# Patient Record
Sex: MALE | Race: WHITE | HISPANIC OR LATINO | Employment: FULL TIME | URBAN - METROPOLITAN AREA
[De-identification: names, ages, dates, MRNs, and addresses within clinical notes are randomized per-mention and may not be internally consistent; named-entity substitution may affect disease eponyms.]

---

## 2018-10-12 ENCOUNTER — HOSPITAL ENCOUNTER (EMERGENCY)
Facility: MEDICAL CENTER | Age: 38
End: 2018-10-12
Attending: EMERGENCY MEDICINE

## 2018-10-12 VITALS
TEMPERATURE: 98.2 F | RESPIRATION RATE: 18 BRPM | HEART RATE: 105 BPM | DIASTOLIC BLOOD PRESSURE: 93 MMHG | OXYGEN SATURATION: 95 % | WEIGHT: 184.3 LBS | SYSTOLIC BLOOD PRESSURE: 152 MMHG

## 2018-10-12 DIAGNOSIS — L03.115 CELLULITIS OF RIGHT LOWER EXTREMITY: ICD-10-CM

## 2018-10-12 PROCEDURE — 700102 HCHG RX REV CODE 250 W/ 637 OVERRIDE(OP): Performed by: EMERGENCY MEDICINE

## 2018-10-12 PROCEDURE — 99283 EMERGENCY DEPT VISIT LOW MDM: CPT

## 2018-10-12 PROCEDURE — A9270 NON-COVERED ITEM OR SERVICE: HCPCS | Performed by: EMERGENCY MEDICINE

## 2018-10-12 RX ORDER — DOXYCYCLINE 100 MG/1
100 CAPSULE ORAL 2 TIMES DAILY
Qty: 20 CAP | Refills: 0 | Status: SHIPPED | OUTPATIENT
Start: 2018-10-12 | End: 2018-10-22

## 2018-10-12 RX ORDER — DOXYCYCLINE 100 MG/1
100 TABLET ORAL ONCE
Status: COMPLETED | OUTPATIENT
Start: 2018-10-12 | End: 2018-10-12

## 2018-10-12 RX ORDER — DOXYCYCLINE 100 MG/1
100 CAPSULE ORAL 2 TIMES DAILY
Qty: 20 CAP | Refills: 0 | Status: SHIPPED | OUTPATIENT
Start: 2018-10-12 | End: 2018-10-12

## 2018-10-12 RX ADMIN — DOXYCYCLINE 100 MG: 100 TABLET ORAL at 02:47

## 2018-10-12 NOTE — ED NOTES
Pt reports that he's had  2 days of right foot pain/swelling/redness. Has had a previous episode of this, was told that had an infection and had to be on ABX

## 2018-10-12 NOTE — PROGRESS NOTES
Patient is being discharged from ED to home. Discharge instructions were discussed by RN with patient and/or Family. No questions at this time. Medications were discussed with patient. VS within normal limits or have been addressed with patient and MD.     Discussed DC ABX

## 2018-10-12 NOTE — ED PROVIDER NOTES
ER Provider Note     Scribed for Art Guerrero M.D. by Carlos Carbajal. 10/12/2018, 2:37 AM.    Means of Arrival: Walk in   History obtained from: Patient  History limited by: None     CHIEF COMPLAINT  Chief Complaint   Patient presents with   • Foot Pain     right foot.       HPI  Jude Moreno is a 28 y.o. male who presents to the Emergency Department with right sided foot pain and swelling. Patient had this same issue a while ago and was given antibiotics that initially relieved his symptoms. However the pain and swelling has returned. Patient has no history of diabetes or other medical issues.    REVIEW OF SYSTEMS  See HPI for further details.    PAST MEDICAL HISTORY  None noted    SURGICAL HISTORY  patient denies any surgical history    SOCIAL HISTORY  Social History   Substance Use Topics   • Smoking status: Current Every Day Smoker     Packs/day: 0.50     Types: Cigarettes   • Smokeless tobacco: Not on file   • Alcohol use No      Comment: sober for two weeks      History   Drug Use No       FAMILY HISTORY  None noted    CURRENT MEDICATIONS  No current facility-administered medications for this encounter.     Current Outpatient Prescriptions:   •  doxycycline (MONODOX) 100 MG capsule, Take 1 Cap by mouth 2 times a day for 10 days., Disp: 20 Cap, Rfl: 0     ALLERGIES  No Known Allergies    PHYSICAL EXAM  VITAL SIGNS: /93   Pulse (!) 105   Temp 36.8 °C (98.2 °F)   Resp 18   Wt 83.6 kg (184 lb 4.9 oz)   SpO2 95%      Constitutional: Alert in no apparent distress.  HENT: Normocephalic, Atraumatic, Bilateral external ears normal. Nose normal.   Eyes: Pupils are equal and reactive. Conjunctiva normal, non-icteric.   Heart: Regular rate and rhythm, no murmurs.    Lungs: Clear to auscultation bilaterally.  Skin: Warm, Dry.   Extremities: Present dorsalis pedis pulses. Redness noted to the lateral aspect of right foot extending from the mid foot to the 3rd, 4th, and 5th toes.  Neurologic: Alert,  Grossly non-focal.   Psychiatric: Affect normal, Judgment normal, Mood normal, Appears appropriate and not intoxicated.       COURSE & MEDICAL DECISION MAKING  Pertinent Labs & Imaging studies reviewed. (See chart for details)    This is a 28 y.o. male that presents with cellulitis of his right foot.  There is no crepitance or any systemic symptoms to suggest significant underlying disease.  At this time I will treat the patient with antibiotics and have him follow-up with the primary care physician..     2:37 AM - Patient seen and examined at bedside.  Patient will be medicated with Adoxa 100mg for his symptoms.     The patient will return for new or worsening symptoms and is stable at the time of discharge.    DISPOSITION:  Patient will be discharged home in stable condition.    FOLLOW UP:  I told the patient to return the emergency department if symptoms worsen and follow-up with his primary care physician.    OUTPATIENT MEDICATIONS:  Discharge Medication List as of 10/12/2018  2:44 AM      START taking these medications    Details   doxycycline (MONODOX) 100 MG capsule Take 1 Cap by mouth 2 times a day for 10 days., Disp-20 Cap, R-0, Print Rx Paper              FINAL IMPRESSION  1. Cellulitis of right lower extremity          Carlos GARCIA (Scribe), am scribing for, and in the presence of, Art Guerrero M.D..    Electronically signed by: Carlos Carbajal (Margot), 10/12/2018    Art GARCIA M.D. personally performed the services described in this documentation, as scribed by Carlos Carbajal in my presence, and it is both accurate and complete. E.    The note accurately reflects work and decisions made by me.  Art Guerrero  10/12/2018  3:36 AM

## 2018-10-12 NOTE — ED TRIAGE NOTES
Chief Complaint   Patient presents with   • Foot Pain     right foot.     /93   Pulse (!) 105   Temp 36.8 °C (98.2 °F)   Resp 18   Wt 83.6 kg (184 lb 4.9 oz)   SpO2 95%     Pt states that he is having right foot pain, lateral side. Foot appears slightly swollen and red, pain includes fifth digit.     Pt was treated in [place] for the same issue several months ago, symptoms resolved and pain restarted today when he woke up.     Pt placed back out into lobby.

## 2019-03-12 ENCOUNTER — HOSPITAL ENCOUNTER (EMERGENCY)
Facility: MEDICAL CENTER | Age: 39
End: 2019-03-13
Attending: EMERGENCY MEDICINE

## 2019-03-12 DIAGNOSIS — G44.89 OTHER HEADACHE SYNDROME: ICD-10-CM

## 2019-03-12 PROCEDURE — 85027 COMPLETE CBC AUTOMATED: CPT

## 2019-03-12 PROCEDURE — 36415 COLL VENOUS BLD VENIPUNCTURE: CPT

## 2019-03-12 PROCEDURE — 80053 COMPREHEN METABOLIC PANEL: CPT

## 2019-03-12 PROCEDURE — 85007 BL SMEAR W/DIFF WBC COUNT: CPT

## 2019-03-12 PROCEDURE — 99283 EMERGENCY DEPT VISIT LOW MDM: CPT

## 2019-03-13 ENCOUNTER — APPOINTMENT (OUTPATIENT)
Dept: RADIOLOGY | Facility: MEDICAL CENTER | Age: 39
End: 2019-03-13
Attending: EMERGENCY MEDICINE

## 2019-03-13 VITALS
HEART RATE: 102 BPM | BODY MASS INDEX: 28.73 KG/M2 | TEMPERATURE: 98.5 F | RESPIRATION RATE: 15 BRPM | WEIGHT: 178.79 LBS | HEIGHT: 66 IN | SYSTOLIC BLOOD PRESSURE: 153 MMHG | OXYGEN SATURATION: 96 % | DIASTOLIC BLOOD PRESSURE: 107 MMHG

## 2019-03-13 LAB
ALBUMIN SERPL BCP-MCNC: 4.5 G/DL (ref 3.2–4.9)
ALBUMIN/GLOB SERPL: 1.4 G/DL
ALP SERPL-CCNC: 117 U/L (ref 30–99)
ALT SERPL-CCNC: 35 U/L (ref 2–50)
AMPHET UR QL SCN: NEGATIVE
ANION GAP SERPL CALC-SCNC: 9 MMOL/L (ref 0–11.9)
APPEARANCE UR: CLEAR
AST SERPL-CCNC: 43 U/L (ref 12–45)
BACTERIA #/AREA URNS HPF: NEGATIVE /HPF
BARBITURATES UR QL SCN: NEGATIVE
BASOPHILS # BLD AUTO: 0 % (ref 0–1.8)
BASOPHILS # BLD: 0 K/UL (ref 0–0.12)
BENZODIAZ UR QL SCN: NEGATIVE
BILIRUB SERPL-MCNC: 0.4 MG/DL (ref 0.1–1.5)
BILIRUB UR QL STRIP.AUTO: NEGATIVE
BUN SERPL-MCNC: 7 MG/DL (ref 8–22)
BZE UR QL SCN: NEGATIVE
CALCIUM SERPL-MCNC: 9 MG/DL (ref 8.5–10.5)
CANNABINOIDS UR QL SCN: NEGATIVE
CHLORIDE SERPL-SCNC: 105 MMOL/L (ref 96–112)
CO2 SERPL-SCNC: 23 MMOL/L (ref 20–33)
COLOR UR: YELLOW
CREAT SERPL-MCNC: 0.72 MG/DL (ref 0.5–1.4)
EKG IMPRESSION: NORMAL
EOSINOPHIL # BLD AUTO: 0.38 K/UL (ref 0–0.51)
EOSINOPHIL NFR BLD: 3.4 % (ref 0–6.9)
EPI CELLS #/AREA URNS HPF: NEGATIVE /HPF
ERYTHROCYTE [DISTWIDTH] IN BLOOD BY AUTOMATED COUNT: 43.1 FL (ref 35.9–50)
GLOBULIN SER CALC-MCNC: 3.2 G/DL (ref 1.9–3.5)
GLUCOSE SERPL-MCNC: 114 MG/DL (ref 65–99)
GLUCOSE UR STRIP.AUTO-MCNC: 100 MG/DL
HCT VFR BLD AUTO: 57.1 % (ref 42–52)
HGB BLD-MCNC: 20 G/DL (ref 14–18)
HYALINE CASTS #/AREA URNS LPF: ABNORMAL /LPF
KETONES UR STRIP.AUTO-MCNC: NEGATIVE MG/DL
LEUKOCYTE ESTERASE UR QL STRIP.AUTO: NEGATIVE
LYMPHOCYTES # BLD AUTO: 2.78 K/UL (ref 1–4.8)
LYMPHOCYTES NFR BLD: 24.8 % (ref 22–41)
MANUAL DIFF BLD: NORMAL
MCH RBC QN AUTO: 32.3 PG (ref 27–33)
MCHC RBC AUTO-ENTMCNC: 35 G/DL (ref 33.7–35.3)
MCV RBC AUTO: 92.2 FL (ref 81.4–97.8)
METHADONE UR QL SCN: NEGATIVE
MICRO URNS: ABNORMAL
MONOCYTES # BLD AUTO: 0.86 K/UL (ref 0–0.85)
MONOCYTES NFR BLD AUTO: 7.7 % (ref 0–13.4)
MORPHOLOGY BLD-IMP: NORMAL
MYELOCYTES NFR BLD MANUAL: 0.9 %
NEUTROPHILS # BLD AUTO: 7.08 K/UL (ref 1.82–7.42)
NEUTROPHILS NFR BLD: 63.2 % (ref 44–72)
NITRITE UR QL STRIP.AUTO: NEGATIVE
NRBC # BLD AUTO: 0 K/UL
NRBC BLD-RTO: 0 /100 WBC
OPIATES UR QL SCN: NEGATIVE
OXYCODONE UR QL SCN: NEGATIVE
PCP UR QL SCN: NEGATIVE
PH UR STRIP.AUTO: 6.5 [PH]
PLATELET # BLD AUTO: 330 K/UL (ref 164–446)
PLATELET BLD QL SMEAR: NORMAL
PMV BLD AUTO: 9.2 FL (ref 9–12.9)
POTASSIUM SERPL-SCNC: 4.5 MMOL/L (ref 3.6–5.5)
PROPOXYPH UR QL SCN: NEGATIVE
PROT SERPL-MCNC: 7.7 G/DL (ref 6–8.2)
PROT UR QL STRIP: 30 MG/DL
RBC # BLD AUTO: 6.19 M/UL (ref 4.7–6.1)
RBC # URNS HPF: ABNORMAL /HPF
RBC BLD AUTO: NORMAL
RBC UR QL AUTO: NEGATIVE
SODIUM SERPL-SCNC: 137 MMOL/L (ref 135–145)
SP GR UR STRIP.AUTO: 1.01
UROBILINOGEN UR STRIP.AUTO-MCNC: 1 MG/DL
WBC # BLD AUTO: 11.2 K/UL (ref 4.8–10.8)
WBC #/AREA URNS HPF: ABNORMAL /HPF

## 2019-03-13 PROCEDURE — 93005 ELECTROCARDIOGRAM TRACING: CPT | Performed by: EMERGENCY MEDICINE

## 2019-03-13 PROCEDURE — 700102 HCHG RX REV CODE 250 W/ 637 OVERRIDE(OP): Performed by: EMERGENCY MEDICINE

## 2019-03-13 PROCEDURE — 70450 CT HEAD/BRAIN W/O DYE: CPT

## 2019-03-13 PROCEDURE — 81001 URINALYSIS AUTO W/SCOPE: CPT

## 2019-03-13 PROCEDURE — 71045 X-RAY EXAM CHEST 1 VIEW: CPT

## 2019-03-13 PROCEDURE — A9270 NON-COVERED ITEM OR SERVICE: HCPCS | Performed by: EMERGENCY MEDICINE

## 2019-03-13 PROCEDURE — 80307 DRUG TEST PRSMV CHEM ANLYZR: CPT

## 2019-03-13 RX ORDER — ACETAMINOPHEN 325 MG/1
650 TABLET ORAL ONCE
Status: COMPLETED | OUTPATIENT
Start: 2019-03-13 | End: 2019-03-13

## 2019-03-13 RX ADMIN — ACETAMINOPHEN 650 MG: 325 TABLET, FILM COATED ORAL at 01:45

## 2019-03-13 NOTE — ED PROVIDER NOTES
ED Provider Note    Scribed for Vince Brooke M.D. by Jorge Adams. 3/13/2019, 12:30 AM.    Primary care provider: Pcp Pt States None  Means of arrival: walk-in  History obtained from: Patient  History limited by: none    CHIEF COMPLAINT  Chief Complaint   Patient presents with   • Dizziness   • Blurred Vision   • Neck Pain       HPI  Jude Montoya is a 38 y.o. male who presents to the Emergency Department with complaints of persistent dizziness that started five days ago (on Friday). He states that he was working at the onset of symptoms. The patient is currently employed as a  and has been able to work through the time that he has been feeling dizzy. He took tylenol at home on friday which helped somewhat alleviate the dizziness. The patient denies any syncopal episodes, vision changes, or recent falls. He associates headache, and fatigue. The patient denies shortness of breath, chest pain, or dysuria. He denies any history of stroke. The patient states that he currently smokes half a pack of cigarettes per day, and used to use cocaine and methamphetamine but no longer does. The patient denies any history of diabetes, or hypertension. The patient states that he has a family history of stroke and diabetes. The patient states that [place] came to the [place] ago, and denies any recent travel outside of the country.    REVIEW OF SYSTEMS  See HPI for further details. All other systems are negative.     PAST MEDICAL HISTORY  Otherwise healthy    SURGICAL HISTORY  patient denies any surgical history    SOCIAL HISTORY  Social History   Substance Use Topics   • Smoking status: Current Every Day Smoker     Packs/day: 0.25     Types: Cigarettes   •     • Alcohol use No      History   Drug Use No       FAMILY HISTORY  Family history of stroke and diabetes    CURRENT MEDICATIONS  Reviewed.  See Encounter Summary.     ALLERGIES  No Known Allergies    PHYSICAL EXAM  VITAL SIGNS: /107    "Pulse (!) 102   Temp 36.9 °C (98.5 °F) (Temporal)   Resp 18   Ht 1.676 m (5' 6\")   Wt 81.1 kg (178 lb 12.7 oz)   SpO2 96%   BMI 28.86 kg/m²   Constitutional: Alert in no apparent distress.  HENT: No signs of trauma, Bilateral external ears normal, Nose normal.   Eyes: Pupils are equal and reactive, Conjunctiva normal, Non-icteric.   Neck: Normal range of motion, No tenderness, Supple, No stridor.   Cardiovascular: Regular rate and rhythm, no murmurs.   Thorax & Lungs: Normal breath sounds, No respiratory distress, No wheezing, No chest tenderness.   Abdomen: Bowel sounds normal, Soft, No tenderness, No masses, No pulsatile masses. No peritoneal signs.  Skin: Warm, Dry, No erythema, No rash.   Back: No bony tenderness, No CVA tenderness.   Extremities: Intact distal pulses, No edema, No tenderness, No cyanosis  Musculoskeletal: Good range of motion in all major joints. No tenderness to palpation or major deformities noted.   Neurologic: Alert , Normal motor function, Normal sensory function, No focal deficits noted. Finger to nose normal, heel to shin normal, cerebellar intact, cranial nerves II-XII intact, strength 5/5 and equal bilaterally, sensation intact throughout, cooperative, appropriate   Psychiatric: Affect normal, Judgment normal, Mood normal.     DIAGNOSTIC STUDIES / PROCEDURES     LABS  Results for orders placed or performed during the hospital encounter of 03/12/19   CBC WITH DIFFERENTIAL   Result Value Ref Range    WBC 11.2 (H) 4.8 - 10.8 K/uL    RBC 6.19 (H) 4.70 - 6.10 M/uL    Hemoglobin 20.0 (H) 14.0 - 18.0 g/dL    Hematocrit 57.1 (H) 42.0 - 52.0 %    MCV 92.2 81.4 - 97.8 fL    MCH 32.3 27.0 - 33.0 pg    MCHC 35.0 33.7 - 35.3 g/dL    RDW 43.1 35.9 - 50.0 fL    Platelet Count 330 164 - 446 K/uL    MPV 9.2 9.0 - 12.9 fL    Neutrophils-Polys 63.20 44.00 - 72.00 %    Lymphocytes 24.80 22.00 - 41.00 %    Monocytes 7.70 0.00 - 13.40 %    Eosinophils 3.40 0.00 - 6.90 %    Basophils 0.00 0.00 - 1.80 % "    Nucleated RBC 0.00 /100 WBC    Neutrophils (Absolute) 7.08 1.82 - 7.42 K/uL    Lymphs (Absolute) 2.78 1.00 - 4.80 K/uL    Monos (Absolute) 0.86 (H) 0.00 - 0.85 K/uL    Eos (Absolute) 0.38 0.00 - 0.51 K/uL    Baso (Absolute) 0.00 0.00 - 0.12 K/uL    NRBC (Absolute) 0.00 K/uL   COMP METABOLIC PANEL   Result Value Ref Range    Sodium 137 135 - 145 mmol/L    Potassium 4.5 3.6 - 5.5 mmol/L    Chloride 105 96 - 112 mmol/L    Co2 23 20 - 33 mmol/L    Anion Gap 9.0 0.0 - 11.9    Glucose 114 (H) 65 - 99 mg/dL    Bun 7 (L) 8 - 22 mg/dL    Creatinine 0.72 0.50 - 1.40 mg/dL    Calcium 9.0 8.5 - 10.5 mg/dL    AST(SGOT) 43 12 - 45 U/L    ALT(SGPT) 35 2 - 50 U/L    Alkaline Phosphatase 117 (H) 30 - 99 U/L    Total Bilirubin 0.4 0.1 - 1.5 mg/dL    Albumin 4.5 3.2 - 4.9 g/dL    Total Protein 7.7 6.0 - 8.2 g/dL    Globulin 3.2 1.9 - 3.5 g/dL    A-G Ratio 1.4 g/dL   URINALYSIS CULTURE, IF INDICATED   Result Value Ref Range    Color Yellow     Character Clear     Specific Gravity 1.013 <1.035    Ph 6.5 5.0 - 8.0    Glucose 100 (A) Negative mg/dL    Ketones Negative Negative mg/dL    Protein 30 (A) Negative mg/dL    Bilirubin Negative Negative    Urobilinogen, Urine 1.0 Negative    Nitrite Negative Negative    Leukocyte Esterase Negative Negative    Occult Blood Negative Negative    Micro Urine Req Microscopic    URINE DRUG SCREEN   Result Value Ref Range    Amphetamines Urine Negative Negative    Barbiturates Negative Negative    Benzodiazepines Negative Negative    Cocaine Metabolite Negative Negative    Methadone Negative Negative    Opiates Negative Negative    Oxycodone Negative Negative    Phencyclidine -Pcp Negative Negative    Propoxyphene Negative Negative    Cannabinoid Metab Negative Negative   URINE MICROSCOPIC (W/UA)   Result Value Ref Range    WBC 0-2 (A) /hpf    RBC 0-2 (A) /hpf    Bacteria Negative None /hpf    Epithelial Cells Negative /hpf    Hyaline Cast 0-2 /lpf   ESTIMATED GFR   Result Value Ref Range    GFR If  African American >60 >60 mL/min/1.73 m 2    GFR If Non African American >60 >60 mL/min/1.73 m 2   DIFFERENTIAL MANUAL   Result Value Ref Range    Myelocytes 0.90 %    Manual Diff Status PERFORMED    PERIPHERAL SMEAR REVIEW   Result Value Ref Range    Peripheral Smear Review see below    PLATELET ESTIMATE   Result Value Ref Range    Plt Estimation Normal    MORPHOLOGY   Result Value Ref Range    RBC Morphology Normal    EKG (NOW)   Result Value Ref Range    Report       [facility] Emergency Dept.    Test Date:  2019  Pt Name:    LARISA ORTA       Department: ER  MRN:        [id number]                      Room:       [facility]  Gender:     Male                         Technician: [id number]  :        1980                   Requested By:MABEL MARTINEZ  Order #:    [id number]                    Reading MD:    Measurements  Intervals                                Axis  Rate:       81                           P:          55  MI:         164                          QRS:        85  QRSD:       84                           T:          25  QT:         372  QTc:        432    Interpretive Statements  SINUS RHYTHM  BORDERLINE T WAVE ABNORMALITIES  BASELINE WANDER IN LEAD(S) V4  No previous ECG available for comparison       All labs were reviewed by me.    EKG  12 Lead EKG interpreted by me to show:  Sinus rhythm  Rate 81  Borderline Right axis deviation  Intervals: Normal  No acute ST wave changes  Inverted T waves in III and aVF    RADIOLOGY  CT-HEAD W/O   Final Result      Head CT without contrast within normal limits. No evidence of acute cerebral infarction, hemorrhage or mass lesion.      DX-CHEST-PORTABLE (1 VIEW)   Final Result      No acute cardiac or pulmonary abnormalities are identified.        The radiologist's interpretation of all radiological studies and images have been reviewed by me.    COURSE & MEDICAL DECISION MAKING  Pertinent Labs & Imaging studies reviewed. (See chart  for details)    12:30 AM - Patient seen and examined at bedside. Patient will be treated with tylenol 650 mg. Ordered CT Head W/O, DX-Chest, CBC with diffs, CMP, UA, Urine drug screen, and EKG to evaluate his symptoms.     Decision Making:  This is a 38 y.o. year old male who presents with above complaint.  Patient does not have a significant history of headaches.  He has been in the [place] for approximately 16 years with no foreign travel.  CT imaging, laboratory evaluation, EKG all does not show any acute abnormalities.  I have a very low suspicion for peripheral or central vertigo at this point.  More likely a headache variant.  I will discharge him to home with outpatient follow-up with clinic as referred.  He is understanding return precautions the ER if needed.  In the meantime he will use Tylenol, NSAIDs and oral hydration for comfort.  Furthermore he does have slightly elevated blood pressure at night which may be secondary to pain response but he will continue to monitor his blood pressure and follow-up with clinic regarding this should he require any treatment.    The patient will return for new or worsening symptoms and is stable at the time of discharge.      DISPOSITION:  Patient will be discharged home in stable condition.    FOLLOW UP:  [facility] / [facility] Internal Medicine  [address]  [address]  [facility] [id number] [phone]        [facility]  [address]  [facility] [address]  [phone]            OUTPATIENT MEDICATIONS:  There are no discharge medications for this patient.          FINAL IMPRESSION  1. Other headache syndrome          Jorge GARCIA), am scribing for, and in the presence of, Vince Brooke M.D..    Electronically signed by: Jorge Sal), 3/13/2019    Vince GARCIA M.D. personally performed the services described in this documentation, as scribed by Jorge Adams in my presence, and it is both accurate and complete.    C  The note  accurately reflects work and decisions made by me.  Vince Brooke  3/13/2019  6:50 AM

## 2019-03-13 NOTE — ED TRIAGE NOTES
"Chief Complaint   Patient presents with   • Dizziness   • Blurred Vision   • Neck Pain     /107   Pulse (!) 102   Temp 36.9 °C (98.5 °F) (Temporal)   Resp 18   Ht 1.676 m (5' 6\")   Wt 81.1 kg (178 lb 12.7 oz)   SpO2 96%   BMI 28.86 kg/m²     Pt has been having dizziness and blurred vision, started on Friday night with a headache, states it resolved over the weekend. Then it restarted yesterday, states he has been sleepy.    States his eyes have been burning, eyes do appear red. And states he had neck pain as well, he is able to fully move head in all directions, pain is present when turning head to the right.     -double vision, n/v, fever/chills.    No corrective lenses.     Pt states he stopped drinking approximately two weeks ago.       "

## 2019-03-14 ENCOUNTER — OFFICE VISIT (OUTPATIENT)
Dept: INTERNAL MEDICINE | Facility: MEDICAL CENTER | Age: 39
End: 2019-03-14

## 2019-03-14 VITALS
SYSTOLIC BLOOD PRESSURE: 120 MMHG | HEIGHT: 66 IN | HEART RATE: 62 BPM | WEIGHT: 180.25 LBS | BODY MASS INDEX: 28.97 KG/M2 | DIASTOLIC BLOOD PRESSURE: 80 MMHG | OXYGEN SATURATION: 94 % | TEMPERATURE: 97.4 F

## 2019-03-14 DIAGNOSIS — E66.3 OVERWEIGHT (BMI 25.0-29.9): ICD-10-CM

## 2019-03-14 DIAGNOSIS — R42 EPISODE OF DIZZINESS: ICD-10-CM

## 2019-03-14 DIAGNOSIS — F17.200 SMOKING: ICD-10-CM

## 2019-03-14 DIAGNOSIS — Z00.00 GENERAL MEDICAL EXAM: ICD-10-CM

## 2019-03-14 DIAGNOSIS — R89.9 ABNORMAL LABORATORY TEST: ICD-10-CM

## 2019-03-14 PROCEDURE — 99204 OFFICE O/P NEW MOD 45 MIN: CPT | Mod: GC | Performed by: INTERNAL MEDICINE

## 2019-03-14 RX ORDER — NICOTINE 21 MG/24HR
1 PATCH, TRANSDERMAL 24 HOURS TRANSDERMAL EVERY 24 HOURS
Qty: 14 PATCH | Refills: 0 | Status: SHIPPED | OUTPATIENT
Start: 2019-03-14 | End: 2019-09-12

## 2019-03-14 ASSESSMENT — PATIENT HEALTH QUESTIONNAIRE - PHQ9
CLINICAL INTERPRETATION OF PHQ2 SCORE: 1
SUM OF ALL RESPONSES TO PHQ QUESTIONS 1-9: 1
5. POOR APPETITE OR OVEREATING: 0 - NOT AT ALL

## 2019-03-14 NOTE — PROGRESS NOTES
"New Patient    Reason to establish: Hospital follow-up    Chief Complaint   Patient presents with   • Hospital Follow-up     dizzi, high BP         HPI:   Jude Montoya is a 38 y.o. male here for hospital follow-up and to establish.     Dizziness  Started on 3/8, and intermittently noted it on weekend (9th and 10th), then noted more on 11th and 12th.  Felt a bit off-balance, and thought both eyes were a bit blurry.    Noted a headache (generalized, all-over) on the 8th, but not since.   Notes he sometimes drinks 6-8 cups of water per days, but often drinks more soda or coffee, rather than water.     At ER, he had a normal CT-head, normal CXR, and had an EKG read as normal, but with possible  TWI in leads III and avF (per ER reading).   In ER, his BP was 157/107, (but then improved to 120's/80's) and  (then improved to ~ 80).  His temperature was normal range.      Today, he states he has no more symptoms. (Resolved since the visit to the ER).   In the office, today, his BP is 120/80.        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  So, even though he did not feel \"too bad\" with his prior balance issue, he was concerned, due to the work he does.     Additionally, he notes he is going through a divorce.  He has been  for 6 months.     He used to drink heavily on weekends, but stopped that recently.   Hx of cocaine and meth use in past (no iv).   Otherwise denies PMH, PSH, allergies, or medications.       Review of Symptoms  GEN/CONST:   Denies fever, fatigue, weakness, or significant weight change.   H/E:     Denies blurry vision or eye pain.  ENT:   Denies sinus pain, sore throat, ear pain, difficulty hearing, or tinnitus.  CARDIO:   Denies chest pain, palpitations, orthopnea, or edema.  RESP:   Denies shortness of breath, wheezing, or coughing.  GI:    Denies nausea, vomiting, diarrhea, constipation, or abdominal pain.   :   Denies dysuria, hematuria, hesitancy, or urgency.  HEME:  Denies " "easy bruising, bleeding,    ALLG:  Denies allergies, asthma, or hives.    MSK:  Denies weakness, edema, joint pains or swellings, or muscle aches.   SKIN:  Denies rashes, itches, or sores.   NEURO:  Denies numbness or tingling, altered sensation, or focal weakness.    ENDO:  Denies heat or cold intolerance, polyuria, or polydipsia.  PSYCH:  Denies anxiety, depression, or insomnia.       Past Medical History:   Diagnosis Date   • Known health problems: none        History reviewed. No pertinent surgical history.      Family History   Problem Relation Age of Onset   • Hypertension Mother    • Diabetes Mother    • Diabetes Father    • Stroke Father        Social History     Social History   • Marital status:      Spouse name: N/A   • Number of children: N/A   • Years of education: N/A     Occupational History   • Not on file.     Social History Main Topics   • Smoking status: Current Every Day Smoker     Packs/day: 0.50     Years: 18.00     Types: Cigarettes   • Smokeless tobacco: Former User     Types: Chew      Comment: Smoke about 10 cig. per day.   • Alcohol use Yes      Comment: Previously heavy use on weekends - cutting back, and trying to stop (3/2019)   • Drug use: No      Comment: Hx of coccain and meth (inh), but stopped in 2019   • Sexual activity: Not Currently     Partners: Female      Comment:      Other Topics Concern   • Not on file     Social History Narrative   • No narrative on file   Done with drinking on the weekends, now.       No current outpatient prescriptions on file.     No current facility-administered medications for this visit.    Denies medications.     Allergies as of 03/14/2019   • (No Known Allergies)   Denies allergies.       Physical Exam  /80 (BP Location: Left arm, Patient Position: Sitting, BP Cuff Size: Adult)   Pulse 62   Temp 36.3 °C (97.4 °F) (Temporal)   Ht 1.676 m (5' 6\")   Wt 81.8 kg (180 lb 4 oz)   SpO2 94%   BMI 29.09 kg/m²   General:  Alert and " oriented, No apparent distress.  Eyes: Pupils equal and reactive. No scleral icterus.   Throat: Clear, no erythema or exudates noted.  Neck: Supple. No lymphadenopathy noted. Thyroid not enlarged.   Lungs: Clear to auscultation bilaterally.  No wheezes or rales.  Cardiovascular: Regular rate and rhythm.  No murmurs, rubs or gallops.  Abdomen:  Soft.  Non-distended.  Non-tender.  No rebound or guarding noted.  Extremities: No pedal edema.  Good general motion of all 4 extremities.    Neurological:  Motor function and sensation grossly intact and symmetrical.   Normal Romberg.  Normal gait.  Normal muscles strength and reflexes.   Psychological: Appears to have normal mood and affect.      Labs:  (from ER visit):  CBC with high WBC and H/H  CMP with high glucose (but in PM / random)  And slight incr. In Alk.phos - 117.  AST, ALT - 43, 35.  Cr - 0.72, eGFR>60.  UA with +protein, and 0-2 Hyaline casts.         Assessment & Plan    1. Episode of dizziness - resolved  Currently not bothering him.  Possibly had been secondary to   dehydration or disequilibrium/ear issue.  However, resolved now.  He will also check his BP occasionally, at a local pharmacy.    (Due to transient elevated BP in ER).  - Resolved.    2. Abnormal laboratory test  In ER, had an elevated WBC and H&H.  Possibly from dehydration; however,   could also be from mild inflammation, and smoking.  Will repeat CBC to see if it has improved.  - CBC WITH DIFFERENTIAL; Future    3. Overweight (BMI 25.0-29.9)  Patient is mildly overweight.  Advised on diet and losing weight.  Recommended to continue to avoid alcohol.  Will obtain A1c, lipid profile.  - HEMOGLOBIN A1C; Future  - Lipid Profile; Future    4. Smoking  Patient getting prescription sent to pharmacy for NicoDerm patches.  He is to use the higher dose (moderate: 14 mg), for 1 or 2 weeks; and then try the lower dose (7 mg), for an additional 1 or 2 weeks, and stop.  - nicotine (NICODERM) 14 MG/24HR  PATCH 24 HR; Apply 1 Patch to skin as directed every 24 hours. (for 1-2 weeks, then try lower dose patch).  Dispense: 14 Patch; Refill: 0  - nicotine (NICODERM) 7 MG/24HR PATCH 24 HR; Apply 1 Patch to skin as directed every 24 hours.  Dispense: 14 Patch; Refill: 0    5. General medical exam  Will obtain basic lab work,   for baseline reference and screening,   as well as for issues listed above.    - CBC WITH DIFFERENTIAL; Future  - Lipid Profile; Future      Health Maintenance  Dexa, Colonoscopy, PSA - n/a    influ vaccine - no   Pneumo Vaccine - n/a  Tetanus - a few months ago.(in [place])  Labs < 12 mo / met screen - recent CBC and CMP.       Instructions given to the patient:  Please get the labs done in the next week.  Please get them done while fasting (no food, coffee, or juices, for 8 hours - but water is ok).   Please try the Nicoderm patches (14 mg patches first, for 1-2 weeks; then use the 7 mg patches for 1-2 weeks; then stop).   Please continue to avoid alcohol.  If desired, please feel free to continue to go to AA meetings, if they help.   Please return in about 2 weeks.   Thank you.       A computerized dictation system may have been used for this note.    Despite review, there may be some spelling or grammatical errors.    Ramakrishna Sloan M.D.  3/14/2019   Attending:  Fanta Martinez M.D.

## 2019-03-14 NOTE — PATIENT INSTRUCTIONS
Please get the labs done in the next week.  Please get them done while fasting (no food, coffee, or juices, for 8 hours - but water is ok).     Please try the Nicoderm patches (14 mg patches first, for 1-2 weeks; then use the 7 mg patches for 1-2 weeks; then stop).      Please continue to avoid alcohol.  If desired, please feel free to continue to go to AA meetings, if they help.     Please return in about 2 weeks.   Thank you.

## 2019-03-18 ENCOUNTER — HOSPITAL ENCOUNTER (OUTPATIENT)
Dept: LAB | Facility: MEDICAL CENTER | Age: 39
End: 2019-03-18
Attending: STUDENT IN AN ORGANIZED HEALTH CARE EDUCATION/TRAINING PROGRAM

## 2019-03-18 DIAGNOSIS — E66.3 OVERWEIGHT (BMI 25.0-29.9): ICD-10-CM

## 2019-03-18 DIAGNOSIS — R89.9 ABNORMAL LABORATORY TEST: ICD-10-CM

## 2019-03-18 DIAGNOSIS — Z00.00 GENERAL MEDICAL EXAM: ICD-10-CM

## 2019-03-18 LAB
BASOPHILS # BLD AUTO: 0 % (ref 0–1.8)
BASOPHILS # BLD: 0 K/UL (ref 0–0.12)
CHOLEST SERPL-MCNC: 210 MG/DL (ref 100–199)
EOSINOPHIL # BLD AUTO: 0.38 K/UL (ref 0–0.51)
EOSINOPHIL NFR BLD: 4 % (ref 0–6.9)
ERYTHROCYTE [DISTWIDTH] IN BLOOD BY AUTOMATED COUNT: 41.9 FL (ref 35.9–50)
EST. AVERAGE GLUCOSE BLD GHB EST-MCNC: 143 MG/DL
FASTING STATUS PATIENT QL REPORTED: NORMAL
HBA1C MFR BLD: 6.6 % (ref 0–5.6)
HCT VFR BLD AUTO: 56.9 % (ref 42–52)
HDLC SERPL-MCNC: 30 MG/DL
HGB BLD-MCNC: 19.5 G/DL (ref 14–18)
LDLC SERPL CALC-MCNC: 129 MG/DL
LYMPHOCYTES # BLD AUTO: 2.78 K/UL (ref 1–4.8)
LYMPHOCYTES NFR BLD: 29 % (ref 22–41)
MANUAL DIFF BLD: NORMAL
MCH RBC QN AUTO: 31.4 PG (ref 27–33)
MCHC RBC AUTO-ENTMCNC: 34.3 G/DL (ref 33.7–35.3)
MCV RBC AUTO: 91.6 FL (ref 81.4–97.8)
MONOCYTES # BLD AUTO: 0.67 K/UL (ref 0–0.85)
MONOCYTES NFR BLD AUTO: 7 % (ref 0–13.4)
MORPHOLOGY BLD-IMP: NORMAL
NEUTROPHILS # BLD AUTO: 5.76 K/UL (ref 1.82–7.42)
NEUTROPHILS NFR BLD: 59 % (ref 44–72)
NEUTS BAND NFR BLD MANUAL: 1 % (ref 0–10)
NRBC # BLD AUTO: 0 K/UL
NRBC BLD-RTO: 0 /100 WBC
PLATELET # BLD AUTO: 345 K/UL (ref 164–446)
PLATELET BLD QL SMEAR: NORMAL
PMV BLD AUTO: 9.2 FL (ref 9–12.9)
RBC # BLD AUTO: 6.21 M/UL (ref 4.7–6.1)
RBC BLD AUTO: NORMAL
TRIGL SERPL-MCNC: 256 MG/DL (ref 0–149)
WBC # BLD AUTO: 9.6 K/UL (ref 4.8–10.8)

## 2019-03-18 PROCEDURE — 36415 COLL VENOUS BLD VENIPUNCTURE: CPT

## 2019-03-18 PROCEDURE — 80061 LIPID PANEL: CPT

## 2019-03-18 PROCEDURE — 85027 COMPLETE CBC AUTOMATED: CPT

## 2019-03-18 PROCEDURE — 85007 BL SMEAR W/DIFF WBC COUNT: CPT

## 2019-03-18 PROCEDURE — 83036 HEMOGLOBIN GLYCOSYLATED A1C: CPT

## 2019-04-08 ENCOUNTER — TELEPHONE (OUTPATIENT)
Dept: INTERNAL MEDICINE | Facility: MEDICAL CENTER | Age: 39
End: 2019-04-08

## 2019-09-12 ENCOUNTER — APPOINTMENT (OUTPATIENT)
Dept: RADIOLOGY | Facility: MEDICAL CENTER | Age: 39
End: 2019-09-12
Attending: EMERGENCY MEDICINE

## 2019-09-12 ENCOUNTER — HOSPITAL ENCOUNTER (OUTPATIENT)
Facility: MEDICAL CENTER | Age: 39
End: 2019-09-13
Attending: EMERGENCY MEDICINE | Admitting: INTERNAL MEDICINE

## 2019-09-12 DIAGNOSIS — R06.09 DOE (DYSPNEA ON EXERTION): ICD-10-CM

## 2019-09-12 LAB
ALBUMIN SERPL BCP-MCNC: 4.1 G/DL (ref 3.2–4.9)
ALBUMIN/GLOB SERPL: 1.6 G/DL
ALP SERPL-CCNC: 104 U/L (ref 30–99)
ALT SERPL-CCNC: 26 U/L (ref 2–50)
ANION GAP SERPL CALC-SCNC: 10 MMOL/L (ref 0–11.9)
AST SERPL-CCNC: 23 U/L (ref 12–45)
BASOPHILS # BLD AUTO: 0.7 % (ref 0–1.8)
BASOPHILS # BLD: 0.06 K/UL (ref 0–0.12)
BILIRUB SERPL-MCNC: 0.4 MG/DL (ref 0.1–1.5)
BUN SERPL-MCNC: 10 MG/DL (ref 8–22)
CALCIUM SERPL-MCNC: 9 MG/DL (ref 8.5–10.5)
CHLORIDE SERPL-SCNC: 102 MMOL/L (ref 96–112)
CO2 SERPL-SCNC: 25 MMOL/L (ref 20–33)
CREAT SERPL-MCNC: 0.95 MG/DL (ref 0.5–1.4)
EKG IMPRESSION: NORMAL
EOSINOPHIL # BLD AUTO: 0.2 K/UL (ref 0–0.51)
EOSINOPHIL NFR BLD: 2.4 % (ref 0–6.9)
ERYTHROCYTE [DISTWIDTH] IN BLOOD BY AUTOMATED COUNT: 43.1 FL (ref 35.9–50)
GLOBULIN SER CALC-MCNC: 2.6 G/DL (ref 1.9–3.5)
GLUCOSE SERPL-MCNC: 109 MG/DL (ref 65–99)
HCT VFR BLD AUTO: 51.1 % (ref 42–52)
HGB BLD-MCNC: 17 G/DL (ref 14–18)
IMM GRANULOCYTES # BLD AUTO: 0.07 K/UL (ref 0–0.11)
IMM GRANULOCYTES NFR BLD AUTO: 0.8 % (ref 0–0.9)
LYMPHOCYTES # BLD AUTO: 2.71 K/UL (ref 1–4.8)
LYMPHOCYTES NFR BLD: 32.7 % (ref 22–41)
MAGNESIUM SERPL-MCNC: 2.3 MG/DL (ref 1.5–2.5)
MCH RBC QN AUTO: 30.1 PG (ref 27–33)
MCHC RBC AUTO-ENTMCNC: 33.3 G/DL (ref 33.7–35.3)
MCV RBC AUTO: 90.6 FL (ref 81.4–97.8)
MONOCYTES # BLD AUTO: 0.69 K/UL (ref 0–0.85)
MONOCYTES NFR BLD AUTO: 8.3 % (ref 0–13.4)
NEUTROPHILS # BLD AUTO: 4.57 K/UL (ref 1.82–7.42)
NEUTROPHILS NFR BLD: 55.1 % (ref 44–72)
NRBC # BLD AUTO: 0 K/UL
NRBC BLD-RTO: 0 /100 WBC
PLATELET # BLD AUTO: 261 K/UL (ref 164–446)
PMV BLD AUTO: 9.2 FL (ref 9–12.9)
POTASSIUM SERPL-SCNC: 3.4 MMOL/L (ref 3.6–5.5)
PROT SERPL-MCNC: 6.7 G/DL (ref 6–8.2)
RBC # BLD AUTO: 5.64 M/UL (ref 4.7–6.1)
SODIUM SERPL-SCNC: 137 MMOL/L (ref 135–145)
TROPONIN T SERPL-MCNC: 9 NG/L (ref 6–19)
TROPONIN T SERPL-MCNC: 9 NG/L (ref 6–19)
WBC # BLD AUTO: 8.3 K/UL (ref 4.8–10.8)

## 2019-09-12 PROCEDURE — 84484 ASSAY OF TROPONIN QUANT: CPT

## 2019-09-12 PROCEDURE — 85025 COMPLETE CBC W/AUTO DIFF WBC: CPT

## 2019-09-12 PROCEDURE — 71045 X-RAY EXAM CHEST 1 VIEW: CPT

## 2019-09-12 PROCEDURE — 99285 EMERGENCY DEPT VISIT HI MDM: CPT

## 2019-09-12 PROCEDURE — 36415 COLL VENOUS BLD VENIPUNCTURE: CPT

## 2019-09-12 PROCEDURE — 80053 COMPREHEN METABOLIC PANEL: CPT

## 2019-09-12 PROCEDURE — G0378 HOSPITAL OBSERVATION PER HR: HCPCS

## 2019-09-12 PROCEDURE — 83735 ASSAY OF MAGNESIUM: CPT

## 2019-09-12 PROCEDURE — 93005 ELECTROCARDIOGRAM TRACING: CPT

## 2019-09-12 PROCEDURE — 93005 ELECTROCARDIOGRAM TRACING: CPT | Performed by: EMERGENCY MEDICINE

## 2019-09-12 RX ORDER — AMOXICILLIN 250 MG
2 CAPSULE ORAL 2 TIMES DAILY
Status: DISCONTINUED | OUTPATIENT
Start: 2019-09-12 | End: 2019-09-13 | Stop reason: HOSPADM

## 2019-09-12 RX ORDER — NICOTINE 21 MG/24HR
14 PATCH, TRANSDERMAL 24 HOURS TRANSDERMAL
Status: DISCONTINUED | OUTPATIENT
Start: 2019-09-13 | End: 2019-09-13 | Stop reason: HOSPADM

## 2019-09-12 RX ORDER — POLYETHYLENE GLYCOL 3350 17 G/17G
1 POWDER, FOR SOLUTION ORAL
Status: DISCONTINUED | OUTPATIENT
Start: 2019-09-12 | End: 2019-09-13 | Stop reason: HOSPADM

## 2019-09-12 RX ORDER — ACETAMINOPHEN 325 MG/1
650 TABLET ORAL EVERY 6 HOURS PRN
Status: DISCONTINUED | OUTPATIENT
Start: 2019-09-12 | End: 2019-09-13 | Stop reason: HOSPADM

## 2019-09-12 RX ORDER — BISACODYL 10 MG
10 SUPPOSITORY, RECTAL RECTAL
Status: DISCONTINUED | OUTPATIENT
Start: 2019-09-12 | End: 2019-09-13 | Stop reason: HOSPADM

## 2019-09-12 ASSESSMENT — LIFESTYLE VARIABLES
EVER_SMOKED: YES
DO YOU DRINK ALCOHOL: NO

## 2019-09-12 NOTE — ED TRIAGE NOTES
Chief Complaint   Patient presents with   • Shortness of Breath     comes and goes x6 months   • Tingling     bilateral arms and legs     Pt ambulated to triage with above complains. Pt said that he had similar episodes and had work up done both here and out patient but did not find anything. Neuro intact, tingling resolving .   Protocol initiated.

## 2019-09-13 ENCOUNTER — APPOINTMENT (OUTPATIENT)
Dept: RADIOLOGY | Facility: MEDICAL CENTER | Age: 39
End: 2019-09-13
Attending: STUDENT IN AN ORGANIZED HEALTH CARE EDUCATION/TRAINING PROGRAM

## 2019-09-13 ENCOUNTER — PATIENT OUTREACH (OUTPATIENT)
Dept: HEALTH INFORMATION MANAGEMENT | Facility: OTHER | Age: 39
End: 2019-09-13

## 2019-09-13 ENCOUNTER — APPOINTMENT (OUTPATIENT)
Dept: CARDIOLOGY | Facility: MEDICAL CENTER | Age: 39
End: 2019-09-13
Attending: STUDENT IN AN ORGANIZED HEALTH CARE EDUCATION/TRAINING PROGRAM

## 2019-09-13 VITALS
DIASTOLIC BLOOD PRESSURE: 58 MMHG | SYSTOLIC BLOOD PRESSURE: 123 MMHG | BODY MASS INDEX: 29.05 KG/M2 | WEIGHT: 180.78 LBS | RESPIRATION RATE: 20 BRPM | HEIGHT: 66 IN | TEMPERATURE: 97.8 F | HEART RATE: 75 BPM | OXYGEN SATURATION: 95 %

## 2019-09-13 PROBLEM — F41.9 ANXIETY: Status: ACTIVE | Noted: 2019-09-13

## 2019-09-13 PROBLEM — Z72.0 TOBACCO ABUSE: Status: ACTIVE | Noted: 2019-09-13

## 2019-09-13 PROBLEM — R06.02 SHORTNESS OF BREATH: Status: ACTIVE | Noted: 2019-09-13

## 2019-09-13 PROBLEM — F19.11 HISTORY OF DRUG ABUSE (HCC): Status: ACTIVE | Noted: 2019-09-13

## 2019-09-13 LAB
ALBUMIN SERPL BCP-MCNC: 3.8 G/DL (ref 3.2–4.9)
ALBUMIN/GLOB SERPL: 1.4 G/DL
ALP SERPL-CCNC: 91 U/L (ref 30–99)
ALT SERPL-CCNC: 23 U/L (ref 2–50)
ANION GAP SERPL CALC-SCNC: 7 MMOL/L (ref 0–11.9)
AST SERPL-CCNC: 21 U/L (ref 12–45)
BASOPHILS # BLD AUTO: 0.9 % (ref 0–1.8)
BASOPHILS # BLD: 0.08 K/UL (ref 0–0.12)
BILIRUB SERPL-MCNC: 0.4 MG/DL (ref 0.1–1.5)
BUN SERPL-MCNC: 13 MG/DL (ref 8–22)
CALCIUM SERPL-MCNC: 8.7 MG/DL (ref 8.5–10.5)
CHLORIDE SERPL-SCNC: 104 MMOL/L (ref 96–112)
CO2 SERPL-SCNC: 24 MMOL/L (ref 20–33)
CREAT SERPL-MCNC: 0.88 MG/DL (ref 0.5–1.4)
D DIMER PPP IA.FEU-MCNC: <0.4 UG/ML (FEU) (ref 0–0.5)
EOSINOPHIL # BLD AUTO: 0.25 K/UL (ref 0–0.51)
EOSINOPHIL NFR BLD: 2.7 % (ref 0–6.9)
ERYTHROCYTE [DISTWIDTH] IN BLOOD BY AUTOMATED COUNT: 42.7 FL (ref 35.9–50)
GLOBULIN SER CALC-MCNC: 2.8 G/DL (ref 1.9–3.5)
GLUCOSE SERPL-MCNC: 125 MG/DL (ref 65–99)
HCT VFR BLD AUTO: 50 % (ref 42–52)
HGB BLD-MCNC: 17.6 G/DL (ref 14–18)
IMM GRANULOCYTES # BLD AUTO: 0.09 K/UL (ref 0–0.11)
IMM GRANULOCYTES NFR BLD AUTO: 1 % (ref 0–0.9)
LV EJECT FRACT  99904: 65
LV EJECT FRACT MOD 2C 99903: 69.25
LV EJECT FRACT MOD 4C 99902: 72.05
LV EJECT FRACT MOD BP 99901: 70.27
LYMPHOCYTES # BLD AUTO: 3.21 K/UL (ref 1–4.8)
LYMPHOCYTES NFR BLD: 34.5 % (ref 22–41)
MCH RBC QN AUTO: 31.5 PG (ref 27–33)
MCHC RBC AUTO-ENTMCNC: 35.2 G/DL (ref 33.7–35.3)
MCV RBC AUTO: 89.4 FL (ref 81.4–97.8)
MONOCYTES # BLD AUTO: 0.91 K/UL (ref 0–0.85)
MONOCYTES NFR BLD AUTO: 9.8 % (ref 0–13.4)
NEUTROPHILS # BLD AUTO: 4.76 K/UL (ref 1.82–7.42)
NEUTROPHILS NFR BLD: 51.1 % (ref 44–72)
NRBC # BLD AUTO: 0 K/UL
NRBC BLD-RTO: 0 /100 WBC
PLATELET # BLD AUTO: 245 K/UL (ref 164–446)
PMV BLD AUTO: 9.2 FL (ref 9–12.9)
POTASSIUM SERPL-SCNC: 3.8 MMOL/L (ref 3.6–5.5)
PROT SERPL-MCNC: 6.6 G/DL (ref 6–8.2)
RBC # BLD AUTO: 5.59 M/UL (ref 4.7–6.1)
SODIUM SERPL-SCNC: 135 MMOL/L (ref 135–145)
TROPONIN T SERPL-MCNC: <6 NG/L (ref 6–19)
WBC # BLD AUTO: 9.3 K/UL (ref 4.8–10.8)

## 2019-09-13 PROCEDURE — 700102 HCHG RX REV CODE 250 W/ 637 OVERRIDE(OP): Performed by: STUDENT IN AN ORGANIZED HEALTH CARE EDUCATION/TRAINING PROGRAM

## 2019-09-13 PROCEDURE — 85025 COMPLETE CBC W/AUTO DIFF WBC: CPT

## 2019-09-13 PROCEDURE — 94760 N-INVAS EAR/PLS OXIMETRY 1: CPT

## 2019-09-13 PROCEDURE — 93306 TTE W/DOPPLER COMPLETE: CPT

## 2019-09-13 PROCEDURE — G0378 HOSPITAL OBSERVATION PER HR: HCPCS

## 2019-09-13 PROCEDURE — 94010 BREATHING CAPACITY TEST: CPT

## 2019-09-13 PROCEDURE — 93306 TTE W/DOPPLER COMPLETE: CPT | Mod: 26 | Performed by: INTERNAL MEDICINE

## 2019-09-13 PROCEDURE — 700111 HCHG RX REV CODE 636 W/ 250 OVERRIDE (IP): Performed by: STUDENT IN AN ORGANIZED HEALTH CARE EDUCATION/TRAINING PROGRAM

## 2019-09-13 PROCEDURE — 36415 COLL VENOUS BLD VENIPUNCTURE: CPT

## 2019-09-13 PROCEDURE — A9270 NON-COVERED ITEM OR SERVICE: HCPCS | Performed by: STUDENT IN AN ORGANIZED HEALTH CARE EDUCATION/TRAINING PROGRAM

## 2019-09-13 PROCEDURE — 90686 IIV4 VACC NO PRSV 0.5 ML IM: CPT | Performed by: STUDENT IN AN ORGANIZED HEALTH CARE EDUCATION/TRAINING PROGRAM

## 2019-09-13 PROCEDURE — 85379 FIBRIN DEGRADATION QUANT: CPT

## 2019-09-13 PROCEDURE — 94150 VITAL CAPACITY TEST: CPT

## 2019-09-13 PROCEDURE — 84484 ASSAY OF TROPONIN QUANT: CPT

## 2019-09-13 PROCEDURE — 80053 COMPREHEN METABOLIC PANEL: CPT

## 2019-09-13 PROCEDURE — 99219 PR INITIAL OBSERVATION CARE,LEVL II: CPT | Mod: GC | Performed by: INTERNAL MEDICINE

## 2019-09-13 PROCEDURE — 90471 IMMUNIZATION ADMIN: CPT

## 2019-09-13 RX ORDER — NICOTINE 21 MG/24HR
1 PATCH, TRANSDERMAL 24 HOURS TRANSDERMAL EVERY 24 HOURS
Qty: 30 PATCH | Refills: 2 | Status: SHIPPED | OUTPATIENT
Start: 2019-09-13 | End: 2021-01-27

## 2019-09-13 RX ORDER — ALBUTEROL SULFATE 90 UG/1
1-2 AEROSOL, METERED RESPIRATORY (INHALATION) EVERY 6 HOURS PRN
Qty: 8.5 G | Refills: 0 | Status: SHIPPED | OUTPATIENT
Start: 2019-09-13 | End: 2019-10-13

## 2019-09-13 RX ADMIN — INFLUENZA A VIRUS A/BRISBANE/02/2018 IVR-190 (H1N1) ANTIGEN (FORMALDEHYDE INACTIVATED), INFLUENZA A VIRUS A/KANSAS/14/2017 X-327 (H3N2) ANTIGEN (FORMALDEHYDE INACTIVATED), INFLUENZA B VIRUS B/PHUKET/3073/2013 ANTIGEN (FORMALDEHYDE INACTIVATED), AND INFLUENZA B VIRUS B/MARYLAND/15/2016 BX-69A ANTIGEN (FORMALDEHYDE INACTIVATED) 0.5 ML: 15; 15; 15; 15 INJECTION, SUSPENSION INTRAMUSCULAR at 11:07

## 2019-09-13 RX ADMIN — NICOTINE 14 MG: 14 PATCH TRANSDERMAL at 05:30

## 2019-09-13 ASSESSMENT — PULMONARY FUNCTION TESTS
FEV1: 3.90
PEFR: 4.85
FEV1_PREDICTED: 3.76
PREDICTED_VC: 4.65
FVC: 3.9
PEFR_PREDICTED: 9.36

## 2019-09-13 ASSESSMENT — ENCOUNTER SYMPTOMS
DOUBLE VISION: 0
PHOTOPHOBIA: 0
SHORTNESS OF BREATH: 1
DEPRESSION: 1
VOMITING: 0
WEIGHT LOSS: 0
HEADACHES: 0
NERVOUS/ANXIOUS: 1
NECK PAIN: 0
DIARRHEA: 0
BLURRED VISION: 0
WHEEZING: 0
WEAKNESS: 0
HEARTBURN: 0
DEPRESSION: 0
NAUSEA: 0
TREMORS: 0
SENSORY CHANGE: 0
SORE THROAT: 0
BACK PAIN: 0
ABDOMINAL PAIN: 0
TINGLING: 0
CHILLS: 0
PALPITATIONS: 0
DIZZINESS: 1
FEVER: 0
HEMOPTYSIS: 0
COUGH: 0
ORTHOPNEA: 0
HEADACHES: 1

## 2019-09-13 ASSESSMENT — LIFESTYLE VARIABLES
TOTAL SCORE: 0
DOES PATIENT WANT TO STOP DRINKING: NO
CONSUMPTION TOTAL: NEGATIVE
HOW MANY TIMES IN THE PAST YEAR HAVE YOU HAD 5 OR MORE DRINKS IN A DAY: 0
AVERAGE NUMBER OF DAYS PER WEEK YOU HAVE A DRINK CONTAINING ALCOHOL: 0
EVER HAD A DRINK FIRST THING IN THE MORNING TO STEADY YOUR NERVES TO GET RID OF A HANGOVER: NO
EVER_SMOKED: YES
ALCOHOL_USE: NO
TOTAL SCORE: 0
HAVE PEOPLE ANNOYED YOU BY CRITICIZING YOUR DRINKING: NO
EVER FELT BAD OR GUILTY ABOUT YOUR DRINKING: NO
ON A TYPICAL DAY WHEN YOU DRINK ALCOHOL HOW MANY DRINKS DO YOU HAVE: 0
HAVE YOU EVER FELT YOU SHOULD CUT DOWN ON YOUR DRINKING: NO
TOTAL SCORE: 0

## 2019-09-13 ASSESSMENT — PATIENT HEALTH QUESTIONNAIRE - PHQ9
2. FEELING DOWN, DEPRESSED, IRRITABLE, OR HOPELESS: NOT AT ALL
SUM OF ALL RESPONSES TO PHQ9 QUESTIONS 1 AND 2: 0
1. LITTLE INTEREST OR PLEASURE IN DOING THINGS: NOT AT ALL

## 2019-09-13 ASSESSMENT — COPD QUESTIONNAIRES
DURING THE PAST 4 WEEKS HOW MUCH DID YOU FEEL SHORT OF BREATH: MOST  OR ALL OF THE TIME
DO YOU EVER COUGH UP ANY MUCUS OR PHLEGM?: NO/ONLY WITH OCCASIONAL COLDS OR INFECTIONS
HAVE YOU SMOKED AT LEAST 100 CIGARETTES IN YOUR ENTIRE LIFE: YES
COPD SCREENING SCORE: 5

## 2019-09-13 NOTE — SENIOR ADMIT NOTE
Senior Admit Note    CC: dyspnea     HPI: Mr. Moreno is a pleasant 40 yo male with no significant PMHx of who presents with ongoing exertional dyspnea for the last 6 months. Patient has been seen in the ED and by PCP for the same issue without a definitive diagnosis. He states that his breathing has gotten worse of the last 2 weeks and it is causing him a lot of anxiety. Dyspnea is made worse with exertion, smoking and stress. He admits to snoring and possibly apnea at night but is unsure. He denies cough, chest pain or palpitations.   He has a hx of intermittent drug use with cocaine and methamphetamine but has not used any for the last 6 months.     In the ED Temp 98.2, HR 98, RR 18, /80, 99% on RA. Labs are unremarkable aside form K 3.4, glucose 109, Trop 9. CXR shows no acute cardiopulmonary process, on independent review he does have plump right pulmonary vasculature.     Physical Exam   Constitutional: He is oriented to person, place, and time. He appears well-developed and well-nourished. No distress.   HENT:   Head: Normocephalic and atraumatic.   Mouth/Throat: Oropharynx is clear and moist.   Eyes: Pupils are equal, round, and reactive to light. EOM are normal. No scleral icterus.   Neck: Normal range of motion. Neck supple. No JVD present.   Cardiovascular: Normal rate, regular rhythm, normal heart sounds and intact distal pulses.   No murmur heard.  Pulmonary/Chest: Effort normal and breath sounds normal. No respiratory distress. He has no wheezes.   Abdominal: Soft. Bowel sounds are normal. He exhibits no distension. There is no tenderness.   Musculoskeletal: Normal range of motion. He exhibits no edema.   Neurological: He is alert and oriented to person, place, and time. No cranial nerve deficit.   Skin: Skin is warm and dry. Capillary refill takes less than 2 seconds. He is not diaphoretic. No erythema.   Psychiatric: He has a normal mood and affect. Thought content normal.     Assessment and  Plan:    38 yo male with multiple ER visits for ongoing exertional dyspnea for the last 6 months. Workup thus far has been negative. Differential diagnosis include pulmonary HTN given his hx of drug use, URBAN, asthma, anxiety or other cardiac etiology.     - admit to observation   - echocardiogram ordered   - cardiac exercise stress test, NPO at midnight   - counseled on smoking cessation   - RT protocol, may benefit form bronchodialator and formal PFTS after discharge   - pt admits to anxiety and some depression, is interesting in medication therapy     Yadira Santamaria MD

## 2019-09-13 NOTE — FLOWSHEET NOTE
Respiratory Therapy Update                       Cough: Non Productive (09/13/19 0500)                        FiO2%: 21 % (09/13/19 0317)  O2 (LPM): 0 (09/13/19 0811)  O2 Daily Delivery Respiratory : Room Air with O2 Available (09/13/19 0317)    Breath Sounds  RUL Breath Sounds: Clear (09/13/19 0843)  RML Breath Sounds: Clear (09/13/19 0843)  RLL Breath Sounds: Diminished (09/13/19 0843)  MICHAEL Breath Sounds: Clear (09/13/19 0843)  LLL Breath Sounds: Diminished (09/13/19 0843)        Events/Summary/Plan: Bedside PFT done.  Good pt. effort and cooperation.  Dr. Shrestha and his student present.   canceled the neb. He stated the before and after was not needed.  FVC = 3.90 83%.  FEV1 = 103% 3.9.  FEF 25-75% = 119% 4.68.  Pt. tolerated test well w/o difficulty. (09/13/19 1050)

## 2019-09-13 NOTE — PROGRESS NOTES
Pt dc'd home. IV and monitor removed. Pt left unit via walking with tech; Refused hospital wheelchair. Personal belongings with pt when leaving unit. Pt given discharge instructions prior to leaving unit including prescription and when to visit with physician; verbalizes understanding. Copy of discharge instructions with pt and in the chart.

## 2019-09-13 NOTE — ED PROVIDER NOTES
ED Provider Note    HPI: Patient is a 39-year-old male who presented to the emergency department September 12, 2019 at 4:02 PM with a chief complaint of exertional shortness of breath.    Patient states he has had exertional shortness of breath intermittently for several months but is become much worse over the last few weeks.  Today he also had numbness and tingling in all 4 extremities.  The symptoms have resolved now.  No chest pain no fever no chills no cough no nausea no vomiting no diarrhea.  No other somatic complaints    Review of Systems: Positive exertional shortness of breath extremity tingling all of which have resolved negative for chest pain fever chills cough nausea vomiting diarrhea.  Review of systems reviewed with patient, all other systems negative    Past medical/surgical history: None    Medications: None    Allergies: None    Social History: Patient smokes 1/2 pack cigarettes per day previous use of cocaine methamphetamine and heavy use of alcohol but is cutting back on alcohol use and stop using drugs earlier this year      Physical exam: Constitutional: Pleasant male awake alert appears slightly anxious  Vital signs: Temperature 98.2 pulse 98 respirations 18 blood pressure 138/80 pulse oximetry 99%  EYES: PERRL, EOMI, Conjunctivae and sclera normal, eyelids normal bilaterally.  Neck: Trachea midline. No cervical masses seen or palpated. Normal range of motion, supple. No meningeal signs elicited.  Cardiac: Regular rate and rhythm. S1-S2 present. No S3 or S4 present. No murmurs, rubs, or gallops heard. No edema or varicosities were seen.   Lungs: Clear to auscultation with good aeration throughout. No wheezes, rales, or rhonchi heard. Patient's chest wall moved symmetrically with each respiratory effort. Patient was not making use of accessory muscles of respiration in breathing.  Abdomen: Soft nontender to palpation. No rebound or guarding elicited. No organomegaly identified. No pulsatile  abdominal masses identified.   Musculoskeletal:  no  pain with palpitation or movement of muscle, bone or joint , no obvious musculoskeletal deformities identified.  Neurologic: alert and awake answers questions appropriately. Moves all four extremities independently, no gross focal abnormalities identified. Normal strength and motor.  Skin: no rash or lesion seen, no palpable dermatologic lesions identified.  Psychiatric: Atient appeared to be somewhat anxious.  He was not delusional or    Medical decision making: EKG obtained on arrival (interpretation) twelve-lead EKG sinus rhythm rate 75.  Morphology P waves QRS complexes T waves unremarkable.  No evidence of ST elevation or depression.  R wave progression normal.  Interpreted as an unremarkable EKG for acute findings    Laboratory studies obtained (please see lab sheet for all results) significant findings included unremarkable CBC. mild hypokalemia is present at 3.4.  Troponin normal at 9.    Chest x-ray obtained; no acute abnormalities were seen    Patient admitted for further evaluation.  While encouraging his studies are thus far unrevealing the patient has had increasing episodes of dyspnea on exertion does have a history of methamphetamine and alcohol use and I am concerned about a possible cardiomyopathy.  Echocardiogram would seem to be indicated.  Patient does not appear to have any signs or symptoms to go along with either aortic disease or pulmonary embolism.  I would characterize his symptoms of having a moderate likelihood of cardiac etiology    Impression dyspnea on exertion

## 2019-09-13 NOTE — ED NOTES
Pt continues to be asymptomatic, family at bedside. VSS. Report given to floor RN. Transport on way for pt .

## 2019-09-13 NOTE — ED NOTES
Med Rec Updated and Complete per Pt at bedside   Allergies Reviewed  No PO ABX last 14 days.    Pt denies RX or OTC medication at this time.

## 2019-09-13 NOTE — PROGRESS NOTES
A/o, respirations are even and unlabored on room air, assessment completed, vital signs stable, SR on the monitor, updated communication board,  poc discussed and understood, verbalized understanding, pt aware NPO for stress test,  all questions answered at this time , fall precautions in place, call button within reach, will continue to monitor

## 2019-09-13 NOTE — DISCHARGE INSTRUCTIONS
Discharge Instructions    Discharged to home by car with self. Discharged via walking, hospital escort: Refused.  Special equipment needed: Not Applicable    Be sure to schedule a follow-up appointment with your primary care doctor or any specialists as instructed.     Discharge Plan:   Diet Plan: Discussed  Activity Level: Discussed  Smoking Cessation Offered: Patient Refused  Confirmed Follow up Appointment: Patient to Call and Schedule Appointment  Confirmed Symptoms Management: Discussed  Medication Reconciliation Updated: Yes  Influenza Vaccine Indication: Indicated: 9 to 64 years of age  Influenza Vaccine Given - only chart on this line when given: Influenza Vaccine Given (See MAR)    I understand that a diet low in cholesterol, fat, and sodium is recommended for good health. Unless I have been given specific instructions below for another diet, I accept this instruction as my diet prescription.   Other diet: Heart Healthy     Special Instructions: None    · Is patient discharged on Warfarin / Coumadin?   No     Depression / Suicide Risk    As you are discharged from this [facility] facility, it is important to learn how to keep safe from harming yourself.    Recognize the warning signs:  · Abrupt changes in personality, positive or negative- including increase in energy   · Giving away possessions  · Change in eating patterns- significant weight changes-  positive or negative  · Change in sleeping patterns- unable to sleep or sleeping all the time   · Unwillingness or inability to communicate  · Depression  · Unusual sadness, discouragement and loneliness  · Talk of wanting to die  · Neglect of personal appearance   · Rebelliousness- reckless behavior  · Withdrawal from people/activities they love  · Confusion- inability to concentrate     If you or a loved one observes any of these behaviors or has concerns about self-harm, here's what you can do:  · Talk about it- your feelings and reasons for harming  yourself  · Remove any means that you might use to hurt yourself (examples: pills, rope, extension cords, firearm)  · Get professional help from the community (Mental Health, Substance Abuse, psychological counseling)  · Do not be alone:Call your Safe Contact- someone whom you trust who will be there for you.  · Call your local CRISIS HOTLINE 859-2989 or 128-196-4071  · Call your local Children's Mobile Crisis Response Team [place] (110) 549-0489 or TaKaDu  · Call the toll free National Suicide Prevention Hotlines   · National Suicide Prevention Lifeline 804-667-SDSL (0661)  · [employer] Network 800-SUICIDE (222-5244)          Falta de aire  (Shortness of Breath)  La falta de aire significa que tiene dificultad para respirar. También puede significar que tiene un problema médico. Debe solicitar atención médica de inmediato si siente que le falta el aire.  CAUSAS   · Insuficiente oxígeno en el aire, ramona en las grandes [place] o un ambiente lleno de humo.  · Ciertas enfermedades pulmonares, infecciones o problemas.  · Enfermedades o afecciones cardíacas, ramona angina de pecho o insuficiencia cardíaca.  · Nivel bajo de glóbulos rojos (anemia).  · Condición física deficiente, que puede provocar falta de aire al hacer ejercicio.  · Lesiones o entumecimiento en el pecho o en la espalda.  · Tener sobrepeso.  · Fumar.  · Ansiedad, que puede hacer que sienta que no tiene aire suficiente.  DIAGNÓSTICO   A menudo, los problemas médicos graves se encuentran kavitha el examen físico. Para determinar porqué sufre de falta de aire podrán realizarle diferentes pruebas. Entre ellas:  · Radiografía de tórax.  · Pruebas funcionales respiratorias.  · Análisis de mihaela.  · Un electrocardiograma (ECG).  · Un electrocardiograma ambulatorio. Un ECG ambulatorio registra los patrones de los latidos cardíacos kavitha 24 horas.  · Prueba de ejercicio.  · Un ecocardiograma transtorácico (ETT). Kavitha el  ecocardiograma, se usan ondas sonoras para evaluar el flujo de la mihaela a través del corazón.  · Un ecocardiograma transesofágico (ETE).  · Diagnósticos por imágenes.  Puede ser que el médico no encuentre steven causa para pinon falta de aire después del examen. En [place], es importante que concurra a un examen de control, según las indicaciones del médico.   TRATAMIENTO   El tratamiento de la falta de aire depende de la causa de jack síntomas y puede variar mucho.  INSTRUCCIONES PARA EL CUIDADO EN EL HOGAR   · No fumar. Fumar es steven causa frecuente de la falta de aire. Si fuma, solicite ayuda para dejar de hacerlo.  · Evite estar cerca de sustancias químicas o de aquellas cosas que puedan interferir en la respiración, ramona vapores de pinturas y polvo.  · Descanse todo lo que sea necesario. Retome jack actividades habituales gradualmente.  · Si le indicaron medicamentos, tómelos ramona se los souza prescrito kavitha todo el tiempo indicado. Estos incluyen el oxígeno y los medicamentos inhalados.  · Cumpla con todas las visitas de control, según le indique pinon médico.  SOLICITE ATENCIÓN MÉDICA SI:   · Pinon afección no mejora en el tiempo esperado.  · Le dat hacer las actividades cotidianas, aun si ha descansado lo suficiente.  · Aparece algún síntoma nuevo.  SOLICITE ATENCIÓN MÉDICA DE INMEDIATO SI:   · La falta de aire empeora.  · Se siente aturdido, se desmaya o tiene tos que no controla con medicamentos.  · Elimina mihaela al toser.  · Siente dolor al respirar.  · Tiene dolor de pecho o en los brazos, hombros o abdomen.  · Tiene fiebre.  · No logra subir escaleras o realizar ejercicio del modo en que lo hacía habitualmente.  ASEGÚRESE DE QUE:  · Comprende estas instrucciones.  · Controlará pinon afección.  · Recibirá ayuda de inmediato si no mejora o si empeora.  Esta información no tiene ramona fin reemplazar el consejo del médico. Asegúrese de hacerle al médico cualquier pregunta que tenga.  Document Released: 09/27/2006  Document Revised: 12/23/2014  [employer] Interactive Patient Education © 2017 [employer]

## 2019-09-13 NOTE — NON-PROVIDER
Internal Medicine Interval Note  Note Author: Karoline Buchanan, Student     Name Jude Moreno       1980   Age/Sex 39 y.o. male   MRN [id number]   Code Status FULL     After 5PM or if no immediate response to page, please call for cross-coverage  Attending/Team: Dr. James See Patient List for primary contact information  Call [phone] to page    1st Call - Day Intern (R1):   Dr. Thomason 2nd Call - Day Sr. Resident (R2/R3):   Dr. Shrestha         Reason for interval visit  (Principal Problem)   Shortness of breath    Interval Problem Daily Status Update  (24 hours)   Mr. Moreno is a 38 y/o male who was admitted to the hospital yesterday secondary to exertional dyspnea x 6 months, worsening over the past few weeks. He describes this as a difficulty in taking a deep breath. He also complained of tingling in his upper and lower extremities.  He denies chest pain and palpitations. All of his symptoms have since resolved. He has a history of cocaine and meth use, but quit 6 months ago. He also drinks heavily and has been trying to cut back over the past 6 months. He has smoked less than 1/2 ppd since he was 19 years old. In the ED, labs were normal except mild hypokalemia and troponin was negative. CXR was normal.     He states he is feeling well this morning. He does not have any complaints at this time. He reports feeling anxious at times when he worries about his shortness of breath symptoms.     Review of Systems   Constitutional: Negative for chills and fever.   HENT: Negative for congestion, sore throat and tinnitus.    Eyes: Negative for blurred vision and double vision.   Respiratory: Positive for shortness of breath (Intermittent, on exertion). Negative for cough and wheezing.    Cardiovascular: Negative for chest pain, palpitations and leg swelling.   Gastrointestinal: Negative for abdominal pain, diarrhea, nausea and vomiting.   Genitourinary: Negative for dysuria and hematuria.  "  Skin: Negative for rash.   Neurological: Negative for weakness and headaches.   Psychiatric/Behavioral: Positive for depression (Reports mild depression intermittently). Negative for suicidal ideas. The patient is nervous/anxious.          Quality Measures  Quality-Core Measures   Reviewed items::  Labs reviewed, Medications reviewed and EKG reviewed  DVT prophylaxis pharmacological::  Enoxaparin (Lovenox)  Ulcer Prophylaxis::  No      Physical Exam       Vitals:    09/12/19 2314 09/13/19 0317 09/13/19 0414 09/13/19 0811   BP: 135/92  114/83 123/58   Pulse: 66 66 83 75   Resp: 16 16 16 20   Temp: 36.7 °C (98.1 °F)  36.3 °C (97.4 °F) 36.6 °C (97.8 °F)   TempSrc: Temporal  Temporal Temporal   SpO2: 96% 94% 95% 95%   Weight: 82 kg (180 lb 12.4 oz)      Height: 1.676 m (5' 6\")        Body mass index is 29.18 kg/m². Weight: 82 kg (180 lb 12.4 oz)  Oxygen Therapy:  Pulse Oximetry: 95 %, O2 (LPM): 0, FiO2%: 21 %, O2 Delivery: None (Room Air)    Physical Exam   Constitutional: He is oriented to person, place, and time and well-developed, well-nourished, and in no distress.   HENT:   Head: Normocephalic and atraumatic.   Eyes: Pupils are equal, round, and reactive to light. Conjunctivae and EOM are normal.   Neck: Normal range of motion.   Cardiovascular: Normal rate, regular rhythm, normal heart sounds and intact distal pulses.   No murmur heard.  Pulmonary/Chest: Effort normal and breath sounds normal. No respiratory distress. He has no wheezes. He has no rales.   Abdominal: Soft. Bowel sounds are normal. There is no tenderness. There is no rebound and no guarding.   Musculoskeletal: He exhibits no edema.   Neurological: He is alert and oriented to person, place, and time. He has normal sensation and normal strength.   Skin: Skin is warm and dry.   Psychiatric: Mood and affect normal.         Lab Data Review:         9/13/2019  11:27 AM    Recent Labs     09/12/19  1629 09/12/19  2247 09/13/19  0222   SODIUM 137  --  135 "   POTASSIUM 3.4*  --  3.8   CHLORIDE 102  --  104   CO2 25  --  24   BUN 10  --  13   CREATININE 0.95  --  0.88   MAGNESIUM  --  2.3  --    CALCIUM 9.0  --  8.7       Recent Labs     09/12/19 1629 09/13/19 0222   ALTSGPT 26 23   ASTSGOT 23 21   ALKPHOSPHAT 104* 91   TBILIRUBIN 0.4 0.4   GLUCOSE 109* 125*       Recent Labs     09/12/19 1629 09/13/19 0222   RBC 5.64 5.59   HEMOGLOBIN 17.0 17.6   HEMATOCRIT 51.1 50.0   PLATELETCT 261 245       Recent Labs     09/12/19 1629 09/13/19 0222   WBC 8.3 9.3   NEUTSPOLYS 55.10 51.10   LYMPHOCYTES 32.70 34.50   MONOCYTES 8.30 9.80   EOSINOPHILS 2.40 2.70   BASOPHILS 0.70 0.90   ASTSGOT 23 21   ALTSGPT 26 23   ALKPHOSPHAT 104* 91   TBILIRUBIN 0.4 0.4           Assessment/Plan     Exertional dyspnea   Jude Moreno is a 38 y/o male w/ a 10 pack year history and history of drug use presenting with exertional dyspnea x 6 months, worsening over the past few weeks. He is completely asymptomatic today while in the hospital, and reports only having dyspnea when he exerts himself or when he is stressed. It is most likely that his symptoms are due to asthma, as his symptoms are intermittent, he reports wheezing during his episodes of dyspnea, and it is a common diagnosis. His smoking history also points to a pulmonary cause of his symptoms. He is too young to develop COPD at this point in his life, and his CXR does not suggest this diagnosis. Alpha-1 antitrypsin deficiency can cause younger onset of COPD, but the patient also does not have findings consistent with cirrhosis. Restrictive lung conditions have also been considered, but most need to be worked up as outpatient. Another cause of his symptoms may sleep apnea. His STOP-BANG score was 4, which is high risk for sleep apnea. Pulmonary embolism was ruled out with a d-dimer <0.40. Differential also includes cardiac causes, including cardiomyopathy secondary to his longstanding drinking and drug use history. His echo shows EF  of 65% with trace tricuspid regurgitation.      Plan:  -Bedside pulmonary function testing  -Follow up with pulmonologist outpatient for PFT and methacholine challenge  -Albuterol rescue inhaler prescribed  -We anticipate that the patient will be discharged today given his negative workup thus far    Substance use  Patient has a 10 pack year history, history of cocaine and meth use, and longstanding EtOH use. He reports he stopped using illicit drugs and cut back on drinking significantly. After discussing his condition with the patient, he is motivating to quit smoking as well.     Plan:  -Encourage patient to continue cutting back on alcohol, drug use, and smoking  -Nicotine patches prescribed     Anxiety  Patient does report some anxiety surrounding his difficulty breathing. He also reports that he is mildly depressed at times, but he denies feelings of wanting to harm himself. PHQ-9 is 3.     Plan:  -Patient should follow up with PCP outpatient  -Follow up with pulmonologist to arise at a clear diagnosis for his condition, which will hopefully help with his anxiety

## 2019-09-13 NOTE — PROGRESS NOTES
Assumed pt care. POC discussed. Pt sleeping comfortably. Unlabored and even breathing,RA. Telemonitored, SB.

## 2019-09-13 NOTE — H&P
Internal Medicine Admitting History and Physical    Note Author: Alejandra Bernal M.D.       Name Jude Moreno       1980   Age/Sex 39 y.o. male   MRN [id number]   Code Status Full code     After 5PM or if no immediate response to page, please call for cross-coverage  Attending/Team: /nereida See Patient List for primary contact information  Call [phone] to page    1st Call - Day Intern (R1):    2nd Call - Day Sr. Resident (R2/R3):          Chief Complaint:   Shortness of breath    HPI:   is a 39 yr old gentleman with no past medical H/o comes to [facility] with complaints of shortness of breath that has been going on since many days but it has gotten worse since the last few days. He says his SOB aggravates with exertion like walking or doing activities but it will not make him stop his activity. It is relieved on lying down and somewhat rest. He feels like he can't get enough air into his lungs. He has weakness in his arms and legs and felt dizzy today but did not have any fall or loss of consciousness.  No chest pain, palpitations, headaches or blurring of vision.  No urinary complaints and has regular bowel movements  He had multiple hospitalizations before for the same complaints of having shortness of breath,feeling dizzy and weak but no cause or diagnosis was found after doing all investigations. He says he wants to find out what is going on with him that is causing his symptoms.  He smokes 6-7 cigarettes on some days when he is stressed out since 18 yrs, quit alcohol 6 months back and quit cocaine 6 months back.    ED course: In the ER, his pulse is 78 and BP was 131/74. Labs are in normal range and Chest xray is in normal  Limits. He was maintaining saturations of 96% on room air. Troponin is 9.    Review of Systems   Constitutional: Negative for chills, fever and weight loss.   HENT: Negative for ear pain, hearing loss and tinnitus.    Eyes:  Negative for blurred vision, double vision and photophobia.   Respiratory: Positive for shortness of breath. Negative for cough and hemoptysis.    Cardiovascular: Negative for chest pain, palpitations and orthopnea.   Gastrointestinal: Negative for abdominal pain, heartburn, nausea and vomiting.   Genitourinary: Negative for dysuria and urgency.   Musculoskeletal: Negative for back pain, joint pain and neck pain.        C/o of bilateral leg pain   Skin: Negative for itching and rash.   Neurological: Positive for dizziness and headaches. Negative for tingling, tremors and sensory change.   Psychiatric/Behavioral: Negative for depression and suicidal ideas.             Past Medical History (Chronic medical problem, known complications and current treatment)    -no significant past medical history    Past Surgical History:  History reviewed. No pertinent surgical history.    Current Outpatient Medications:  Home Medications     Reviewed by Siddharth Sevilla (Pharmacy Tech) on 09/12/19 at 6404  Med List Status: Complete   Medication Last Dose Status        Patient Zbigniew Taking any Medications                       Medication Allergy/Sensitivities:  No Known Allergies      Family History (mandatory)   Family History   Problem Relation Age of Onset   • Hypertension Mother    • Diabetes Mother    • Diabetes Father    • Stroke Father        Social History (mandatory)   Social History     Socioeconomic History   • Marital status:      Spouse name: Not on file   • Number of children: Not on file   • Years of education: Not on file   • Highest education level: Not on file   Occupational History   • Not on file   Social Needs   • Financial resource strain: Not on file   • Food insecurity:     Worry: Not on file     Inability: Not on file   • Transportation needs:     Medical: Not on file     Non-medical: Not on file   Tobacco Use   • Smoking status: Current Every Day Smoker     Packs/day: 0.50     Years: 18.00      "Pack years: 9.00     Types: Cigarettes   • Smokeless tobacco: Former User     Types: Chew   • Tobacco comment: Smoke about 10 cig. per day.   Substance and Sexual Activity   • Alcohol use: Yes     Comment: Previously heavy use on weekends - cutting back, and trying to stop (3/2019)   • Drug use: No     Comment: Hx of coccain and meth (inh), but stopped in 2019   • Sexual activity: Not Currently     Partners: Female     Comment:    Lifestyle   • Physical activity:     Days per week: Not on file     Minutes per session: Not on file   • Stress: Not on file   Relationships   • Social connections:     Talks on phone: Not on file     Gets together: Not on file     Attends Buddhist service: Not on file     Active member of club or organization: Not on file     Attends meetings of clubs or organizations: Not on file     Relationship status: Not on file   • Intimate partner violence:     Fear of current or ex partner: Not on file     Emotionally abused: Not on file     Physically abused: Not on file     Forced sexual activity: Not on file   Other Topics Concern   • Not on file   Social History Narrative    ** Merged History Encounter **          Living situation:   PCP : Ramakrishna Sloan M.D.    Physical Exam     Vitals:    09/12/19 2112 09/12/19 2200 09/12/19 2245 09/12/19 2314   BP: 126/73 134/64 128/68 135/92   Pulse: 62 63 62 66   Resp: 17 15 18 16   Temp:  36.7 °C (98.1 °F) 36.7 °C (98 °F) 36.7 °C (98.1 °F)   TempSrc:  Temporal Temporal Temporal   SpO2: 96% 97% 97% 96%   Weight:    82 kg (180 lb 12.4 oz)   Height:    1.676 m (5' 6\")     Body mass index is 29.18 kg/m².  O2 therapy: Pulse Oximetry: 96 %, O2 (LPM): 0, O2 Delivery: None (Room Air)    Physical Exam   Constitutional: He is oriented to person, place, and time and well-developed, well-nourished, and in no distress.   HENT:   Head: Normocephalic and atraumatic.   Eyes: Pupils are equal, round, and reactive to light. Conjunctivae and EOM are normal. "   Neck: Normal range of motion. Neck supple.   Cardiovascular: Normal rate, regular rhythm and normal heart sounds.   Pulmonary/Chest: Effort normal and breath sounds normal. No respiratory distress. He has no wheezes.   Abdominal: Soft. Bowel sounds are normal.   Musculoskeletal: Normal range of motion.   Neurological: He is alert and oriented to person, place, and time.   Skin: Skin is warm and dry.   Psychiatric: Affect and judgment normal.         Data Review       Old Records Request:   Completed  Current Records review/summary: Completed    Lab Data Review:  Recent Results (from the past 24 hour(s))   EKG    Collection Time: 19  4:16 PM   Result Value Ref Range    Report       [facility]    Test Date:  2019  Pt Name:    LARISA ORTA       Department: ER  MRN:        [id number]                      Room:  Gender:     Male                         Technician: [id number]  :        1980                   Requested By:ER TRIAGE PROTOCOL  Order #:    [id number]                    Reading MD: LONNY VALLE MD    Measurements  Intervals                                Axis  Rate:       75                           P:          56  MO:         168                          QRS:        73  QRSD:       84                           T:          44  QT:         376  QTc:        420    Interpretive Statements  SINUS RHYTHM  Compared to ECG 2019 01:43:34  T-wave abnormality no longer present    Electronically Signed On 2019 20:42:29 PDT by LONNY VALLE MD     CBC WITH DIFFERENTIAL    Collection Time: 19  4:29 PM   Result Value Ref Range    WBC 8.3 4.8 - 10.8 K/uL    RBC 5.64 4.70 - 6.10 M/uL    Hemoglobin 17.0 14.0 - 18.0 g/dL    Hematocrit 51.1 42.0 - 52.0 %    MCV 90.6 81.4 - 97.8 fL    MCH 30.1 27.0 - 33.0 pg    MCHC 33.3 (L) 33.7 - 35.3 g/dL    RDW 43.1 35.9 - 50.0 fL    Platelet Count 261 164 - 446 K/uL    MPV 9.2 9.0 - 12.9 fL     Neutrophils-Polys 55.10 44.00 - 72.00 %    Lymphocytes 32.70 22.00 - 41.00 %    Monocytes 8.30 0.00 - 13.40 %    Eosinophils 2.40 0.00 - 6.90 %    Basophils 0.70 0.00 - 1.80 %    Immature Granulocytes 0.80 0.00 - 0.90 %    Nucleated RBC 0.00 /100 WBC    Neutrophils (Absolute) 4.57 1.82 - 7.42 K/uL    Lymphs (Absolute) 2.71 1.00 - 4.80 K/uL    Monos (Absolute) 0.69 0.00 - 0.85 K/uL    Eos (Absolute) 0.20 0.00 - 0.51 K/uL    Baso (Absolute) 0.06 0.00 - 0.12 K/uL    Immature Granulocytes (abs) 0.07 0.00 - 0.11 K/uL    NRBC (Absolute) 0.00 K/uL   COMP METABOLIC PANEL    Collection Time: 09/12/19  4:29 PM   Result Value Ref Range    Sodium 137 135 - 145 mmol/L    Potassium 3.4 (L) 3.6 - 5.5 mmol/L    Chloride 102 96 - 112 mmol/L    Co2 25 20 - 33 mmol/L    Anion Gap 10.0 0.0 - 11.9    Glucose 109 (H) 65 - 99 mg/dL    Bun 10 8 - 22 mg/dL    Creatinine 0.95 0.50 - 1.40 mg/dL    Calcium 9.0 8.5 - 10.5 mg/dL    AST(SGOT) 23 12 - 45 U/L    ALT(SGPT) 26 2 - 50 U/L    Alkaline Phosphatase 104 (H) 30 - 99 U/L    Total Bilirubin 0.4 0.1 - 1.5 mg/dL    Albumin 4.1 3.2 - 4.9 g/dL    Total Protein 6.7 6.0 - 8.2 g/dL    Globulin 2.6 1.9 - 3.5 g/dL    A-G Ratio 1.6 g/dL   ESTIMATED GFR    Collection Time: 09/12/19  4:29 PM   Result Value Ref Range    GFR If African American >60 >60 mL/min/1.73 m 2    GFR If Non African American >60 >60 mL/min/1.73 m 2   TROPONIN    Collection Time: 09/12/19  8:48 PM   Result Value Ref Range    Troponin T 9 6 - 19 ng/L   Magnesium    Collection Time: 09/12/19 10:47 PM   Result Value Ref Range    Magnesium 2.3 1.5 - 2.5 mg/dL   Troponin    Collection Time: 09/12/19 10:47 PM   Result Value Ref Range    Troponin T 9 6 - 19 ng/L       Imaging/Procedures Review:    Independant Imaging Review: Completed  DX-CHEST-PORTABLE (1 VIEW)   Final Result      No acute cardiopulmonary abnormality.      EC-ECHOCARDIOGRAM COMPLETE W/O CONT    (Results Pending)   Cardiac Stress Test Treadmill ONLY    (Results Pending)             EKG:   EKG Independent Review: Completed  QTc:, HR: , Normal Sinus Rhythm, no ST/T changes     Records reviewed and summarized in current documentation :  Yes  [employer] teaching service handout given to patient:  No         Assessment/Plan     Anxiety  Assessment & Plan  -Pt feels anxious when he is stressed out and his mood goes down  -not clear if his symptoms are related to anxiety  -will consider medications after ruling out other causes and further evaluation  -not on any home meds    Shortness of breath  Assessment & Plan  Pt is having shortness of breath since many days but getting worse since the las couple of weeks. He came to the ED before many times for the same symptoms but no obvious cause was found  -Vitals stable and pt is maintaining saturations of 96% on room air  -EKG and troponin are in normal limits  -chest x ray is normal but shows increased pulmonary vascular markings on the right side  -SoB due to cardiac cause vs pulmonary hypertension given his cocaine use   -will get echocardiography and stress test to rule out any abnormality  -Npo from midnight for stress test  -respiratory protocol placed  -monitor      Anticipated Hospital stay: Observation admit        Quality Measures  Quality-Core Measures   Reviewed items::  Labs reviewed, Medications reviewed and EKG reviewed  DVT prophylaxis pharmacological::  Enoxaparin (Lovenox)  Ulcer Prophylaxis::  No    PCP: Ramakrishna Sloan M.D.

## 2019-09-13 NOTE — PROGRESS NOTES
MD paged per request; TO ordered received to cancel stress test and contact RT for bedside eval. per order. Nuc med updated. RT to be contacted once order placed. Pt to be discharge later today per Dr. Shrestha.

## 2019-09-13 NOTE — DISCHARGE SUMMARY
Internal Medicine Discharge Summary  Note Author: Herman Shrestha M.D.     Admit Date:  9/12/2019       Discharge Date:   09/13/19    Service:   [facility] Internal Medicine White Team  Attending Physician(s):   Dr James       Senior Resident(s):   Dr Shrestha    Diagnoses:                Principal Problem:    Shortness of breath POA: Yes  Active Problems:    Anxiety POA: Yes    Tobacco abuse POA: Yes    History of drug abuse POA: Yes  Resolved Problems:    * No resolved hospital problems. *    Hospital Summary (Brief Narrative):       38 y/o M with pmh of smoking and past drug use comes in for continuous dyspnea on exertion for the past 6 months, worsening over the past few days.  Shortness of breath is exacerbated by exertion and relieved with rest, he works in construction and says over the past few days symptoms have been worse.  He is a smoker, less than half a pack a day.  He was evaluated for this in the past and seemed to have a chest x-ray that was unremarkable. Used to use meth and cocaine, but denies any use for past 6 months. Denies chest pain, headache, dizziness, cough, upper respiratory symptoms, allergies, other acute complaints.   CXR in ED was unremarkable but did seem to be hyperinflated.  On exam he did have diminished breath sounds in the bases bilaterally.  Echocardiogram showed EF of 65%, no wall motion or valvular abnormalities. D dimer negative. Bedside PFT essentially unremakrble.     Spoke with schedulers for outpatient follow-up with his PCP.  Will need outpatient PFTs with challenge, sleep study, further evaluation for anxiety.    Patient /Hospital Summary (Details -- Problem Oriented) :          * Shortness of breath  Assessment & Plan  CXR shows some hyperinflation, otherwise unremarkable  EKG and troponin unremarkable  Echocardiogram unremarkable  D-dimer negative  Bedside PFT negative  Symptoms maybe a combination of smoking, asthma, URBAN  We will need outpatient follow-up full PFT,  sleep stud  PRN albuterol prescription    Counseled on smoking cessation    History of drug abuse  Assessment & Plan  Past hx of meth and cocaine use, says quit 6 months ago  Echo unremarkable     Tobacco abuse  Assessment & Plan  Counseled on smoking cessation. Nicotine patches   Meds by outpt PCP if pt willing     Anxiety  Assessment & Plan  Says he feels anxious and more stressed lately. Will need outpt eval    Procedures:      Bedside PFT  Imaging/ Testing:      EC-ECHOCARDIOGRAM COMPLETE W/O CONT   Final Result      DX-CHEST-PORTABLE (1 VIEW)   Final Result      No acute cardiopulmonary abnormality.        Discharge Medications:         Medication Reconciliation: Completed       Medication List      START taking these medications      Instructions   albuterol 108 (90 Base) MCG/ACT Aers inhalation aerosol   Inhale 1-2 Puffs by mouth every 6 hours as needed for Shortness of Breath for up to 30 days.  Dose:  1-2 Puff     nicotine 14 MG/24HR Pt24  Commonly known as:  NICODERM   Apply 1 Patch to skin as directed every 24 hours.  Dose:  1 Patch          Disposition:   Home  Diet:   Healthy   Activity:   Encouraged     Instructions:      The patient was instructed to return to the ER in the event of worsening symptoms. I have counseled the patient on the importance of compliance and the patient has agreed to proceed with all medical recommendations and follow up plan indicated above.   The patient understands that all medications come with benefits and risks. Risks may include permanent injury or death and these risks can be minimized with close reassessment and monitoring.        Primary Care Provider:    Ramakrishna Sloan M.D.  Discharge summary faxed to primary care provider:  Deferred  Copy of discharge summary given to the patient: Deferred      Follow up appointment details :      No future appointments.  Ramakrishna Sloan M.D.  [address] [address]  [phone]    Go on  9/19/2019  Please arrive at 7:20 am for your appointment at 7:40 am . The cost of appointment is $188.00 and if you pay at the time of service it will be $94.00. If you are unable to make appointment please call to cancel. Thank you   Summary of follow up issues:   Outpt PFT and sleep study     Discharge Time (Minutes) :    45  Hospital Course Type: Observation Stay    Condition on Discharge  Stable  ______________________________________________________________________    Interval history/exam for day of discharge:    General: NAD  HEENT: NC/AT,  moist oral mucosa, Mallampati 3  Respiratory: diminished at the bases bilaterally, no wheezing or rales  Cardio: RRR, no murmurs  Abd: soft, nontender  Neuro: AOx3,  No focal deficits  Psych: anxious slightly     Most Recent Labs:    Lab Results   Component Value Date/Time    WBC 9.3 09/13/2019 02:22 AM    RBC 5.59 09/13/2019 02:22 AM    HEMOGLOBIN 17.6 09/13/2019 02:22 AM    HEMATOCRIT 50.0 09/13/2019 02:22 AM    MCV 89.4 09/13/2019 02:22 AM    MCH 31.5 09/13/2019 02:22 AM    MCHC 35.2 09/13/2019 02:22 AM    MPV 9.2 09/13/2019 02:22 AM    NEUTSPOLYS 51.10 09/13/2019 02:22 AM    LYMPHOCYTES 34.50 09/13/2019 02:22 AM    MONOCYTES 9.80 09/13/2019 02:22 AM    EOSINOPHILS 2.70 09/13/2019 02:22 AM    BASOPHILS 0.90 09/13/2019 02:22 AM      Lab Results   Component Value Date/Time    SODIUM 135 09/13/2019 02:22 AM    POTASSIUM 3.8 09/13/2019 02:22 AM    CHLORIDE 104 09/13/2019 02:22 AM    CO2 24 09/13/2019 02:22 AM    GLUCOSE 125 (H) 09/13/2019 02:22 AM    BUN 13 09/13/2019 02:22 AM    CREATININE 0.88 09/13/2019 02:22 AM      Lab Results   Component Value Date/Time    ALTSGPT 23 09/13/2019 02:22 AM    ASTSGOT 21 09/13/2019 02:22 AM    ALKPHOSPHAT 91 09/13/2019 02:22 AM    TBILIRUBIN 0.4 09/13/2019 02:22 AM    ALBUMIN 3.8 09/13/2019 02:22 AM    GLOBULIN 2.8 09/13/2019 02:22 AM     No results found for: PROTHROMBTM, INR

## 2019-09-13 NOTE — CARE PLAN
Denies SOB at this time, unlabored and even breathing Ra. RT protocol in use. Fall precautions in place, educated to call for assistance. POC discussed, verbalized understanding.

## 2019-09-13 NOTE — ED NOTES
Repeat troponin in process. VSS. Pending results & disposition. Pt in NAD, resting comfortably at this time.

## 2019-09-13 NOTE — PROGRESS NOTES
Bedside report received [id number]. POC discussed with pt; Pt denies CP, and SOB; Maintain o2 saturation on RA; No overnight cardiac events; all questions answered at this time.

## 2019-09-13 NOTE — ASSESSMENT & PLAN NOTE
CXR shows some hyperinflation, otherwise unremarkable  EKG and troponin unremarkable  Echocardiogram unremarkable  D-dimer negative  Bedside PFT negative  Symptoms maybe a combination of smoking, asthma, RUBAN  We will need outpatient follow-up full PFT, sleep stud  PRN albuterol prescription    Counseled on smoking cessation

## 2021-01-27 ENCOUNTER — HOSPITAL ENCOUNTER (EMERGENCY)
Facility: MEDICAL CENTER | Age: 41
End: 2021-01-27
Attending: EMERGENCY MEDICINE
Payer: OTHER GOVERNMENT

## 2021-01-27 ENCOUNTER — APPOINTMENT (OUTPATIENT)
Dept: RADIOLOGY | Facility: MEDICAL CENTER | Age: 41
End: 2021-01-27
Attending: STUDENT IN AN ORGANIZED HEALTH CARE EDUCATION/TRAINING PROGRAM
Payer: OTHER GOVERNMENT

## 2021-01-27 VITALS
RESPIRATION RATE: 16 BRPM | HEIGHT: 66 IN | OXYGEN SATURATION: 92 % | BODY MASS INDEX: 30.82 KG/M2 | WEIGHT: 191.8 LBS | TEMPERATURE: 97.5 F | HEART RATE: 108 BPM | DIASTOLIC BLOOD PRESSURE: 84 MMHG | SYSTOLIC BLOOD PRESSURE: 149 MMHG

## 2021-01-27 DIAGNOSIS — I10 ESSENTIAL HYPERTENSION: ICD-10-CM

## 2021-01-27 DIAGNOSIS — R06.02 SOB (SHORTNESS OF BREATH): ICD-10-CM

## 2021-01-27 DIAGNOSIS — R00.2 PALPITATIONS: ICD-10-CM

## 2021-01-27 DIAGNOSIS — R06.02 SHORTNESS OF BREATH: ICD-10-CM

## 2021-01-27 DIAGNOSIS — R00.0 SINUS TACHYCARDIA: ICD-10-CM

## 2021-01-27 DIAGNOSIS — I16.0 HYPERTENSIVE URGENCY: ICD-10-CM

## 2021-01-27 LAB
ALBUMIN SERPL BCP-MCNC: 4.4 G/DL (ref 3.2–4.9)
ALBUMIN/GLOB SERPL: 1.4 G/DL
ALP SERPL-CCNC: 136 U/L (ref 30–99)
ALT SERPL-CCNC: 32 U/L (ref 2–50)
AMPHET UR QL SCN: POSITIVE
ANION GAP SERPL CALC-SCNC: 14 MMOL/L (ref 7–16)
APPEARANCE UR: CLEAR
AST SERPL-CCNC: 28 U/L (ref 12–45)
BACTERIA #/AREA URNS HPF: NEGATIVE /HPF
BARBITURATES UR QL SCN: NEGATIVE
BASOPHILS # BLD AUTO: 0.4 % (ref 0–1.8)
BASOPHILS # BLD: 0.07 K/UL (ref 0–0.12)
BENZODIAZ UR QL SCN: NEGATIVE
BILIRUB SERPL-MCNC: 0.5 MG/DL (ref 0.1–1.5)
BILIRUB UR QL STRIP.AUTO: NEGATIVE
BUN SERPL-MCNC: 11 MG/DL (ref 8–22)
BZE UR QL SCN: NEGATIVE
CALCIUM SERPL-MCNC: 10.2 MG/DL (ref 8.5–10.5)
CANNABINOIDS UR QL SCN: NEGATIVE
CHLORIDE SERPL-SCNC: 101 MMOL/L (ref 96–112)
CO2 SERPL-SCNC: 23 MMOL/L (ref 20–33)
COLOR UR: YELLOW
CREAT SERPL-MCNC: 0.77 MG/DL (ref 0.5–1.4)
EKG IMPRESSION: NORMAL
EOSINOPHIL # BLD AUTO: 0.06 K/UL (ref 0–0.51)
EOSINOPHIL NFR BLD: 0.4 % (ref 0–6.9)
EPI CELLS #/AREA URNS HPF: NEGATIVE /HPF
ERYTHROCYTE [DISTWIDTH] IN BLOOD BY AUTOMATED COUNT: 45.7 FL (ref 35.9–50)
GLOBULIN SER CALC-MCNC: 3.1 G/DL (ref 1.9–3.5)
GLUCOSE SERPL-MCNC: 140 MG/DL (ref 65–99)
GLUCOSE UR STRIP.AUTO-MCNC: >=1000 MG/DL
HCT VFR BLD AUTO: 56.9 % (ref 42–52)
HGB BLD-MCNC: 19.7 G/DL (ref 14–18)
HYALINE CASTS #/AREA URNS LPF: ABNORMAL /LPF
IMM GRANULOCYTES # BLD AUTO: 0.11 K/UL (ref 0–0.11)
IMM GRANULOCYTES NFR BLD AUTO: 0.7 % (ref 0–0.9)
KETONES UR STRIP.AUTO-MCNC: NEGATIVE MG/DL
LEUKOCYTE ESTERASE UR QL STRIP.AUTO: NEGATIVE
LYMPHOCYTES # BLD AUTO: 2.61 K/UL (ref 1–4.8)
LYMPHOCYTES NFR BLD: 15.5 % (ref 22–41)
MAGNESIUM SERPL-MCNC: 2 MG/DL (ref 1.5–2.5)
MCH RBC QN AUTO: 31.4 PG (ref 27–33)
MCHC RBC AUTO-ENTMCNC: 34.6 G/DL (ref 33.7–35.3)
MCV RBC AUTO: 90.7 FL (ref 81.4–97.8)
METHADONE UR QL SCN: NEGATIVE
MICRO URNS: ABNORMAL
MONOCYTES # BLD AUTO: 2.15 K/UL (ref 0–0.85)
MONOCYTES NFR BLD AUTO: 12.8 % (ref 0–13.4)
NEUTROPHILS # BLD AUTO: 11.86 K/UL (ref 1.82–7.42)
NEUTROPHILS NFR BLD: 70.2 % (ref 44–72)
NITRITE UR QL STRIP.AUTO: NEGATIVE
NRBC # BLD AUTO: 0 K/UL
NRBC BLD-RTO: 0 /100 WBC
OPIATES UR QL SCN: NEGATIVE
OXYCODONE UR QL SCN: NEGATIVE
PCP UR QL SCN: NEGATIVE
PH UR STRIP.AUTO: 5.5 [PH] (ref 5–8)
PLATELET # BLD AUTO: 309 K/UL (ref 164–446)
PMV BLD AUTO: 9.6 FL (ref 9–12.9)
POTASSIUM SERPL-SCNC: 3.8 MMOL/L (ref 3.6–5.5)
PROPOXYPH UR QL SCN: NEGATIVE
PROT SERPL-MCNC: 7.5 G/DL (ref 6–8.2)
PROT UR QL STRIP: 100 MG/DL
RBC # BLD AUTO: 6.27 M/UL (ref 4.7–6.1)
RBC # URNS HPF: ABNORMAL /HPF
RBC UR QL AUTO: ABNORMAL
SODIUM SERPL-SCNC: 138 MMOL/L (ref 135–145)
SP GR UR STRIP.AUTO: 1.03
T4 FREE SERPL-MCNC: 1.16 NG/DL (ref 0.93–1.7)
TROPONIN T SERPL-MCNC: 11 NG/L (ref 6–19)
TSH SERPL DL<=0.005 MIU/L-ACNC: 2.74 UIU/ML (ref 0.38–5.33)
UROBILINOGEN UR STRIP.AUTO-MCNC: 0.2 MG/DL
WBC # BLD AUTO: 16.9 K/UL (ref 4.8–10.8)
WBC #/AREA URNS HPF: ABNORMAL /HPF

## 2021-01-27 PROCEDURE — 87040 BLOOD CULTURE FOR BACTERIA: CPT | Mod: 91

## 2021-01-27 PROCEDURE — U0005 INFEC AGEN DETEC AMPLI PROBE: HCPCS

## 2021-01-27 PROCEDURE — 81001 URINALYSIS AUTO W/SCOPE: CPT | Mod: XU

## 2021-01-27 PROCEDURE — 700101 HCHG RX REV CODE 250: Performed by: STUDENT IN AN ORGANIZED HEALTH CARE EDUCATION/TRAINING PROGRAM

## 2021-01-27 PROCEDURE — 80307 DRUG TEST PRSMV CHEM ANLYZR: CPT

## 2021-01-27 PROCEDURE — 700111 HCHG RX REV CODE 636 W/ 250 OVERRIDE (IP): Performed by: STUDENT IN AN ORGANIZED HEALTH CARE EDUCATION/TRAINING PROGRAM

## 2021-01-27 PROCEDURE — 85025 COMPLETE CBC W/AUTO DIFF WBC: CPT

## 2021-01-27 PROCEDURE — 96365 THER/PROPH/DIAG IV INF INIT: CPT

## 2021-01-27 PROCEDURE — 700105 HCHG RX REV CODE 258: Performed by: STUDENT IN AN ORGANIZED HEALTH CARE EDUCATION/TRAINING PROGRAM

## 2021-01-27 PROCEDURE — 84439 ASSAY OF FREE THYROXINE: CPT

## 2021-01-27 PROCEDURE — U0003 INFECTIOUS AGENT DETECTION BY NUCLEIC ACID (DNA OR RNA); SEVERE ACUTE RESPIRATORY SYNDROME CORONAVIRUS 2 (SARS-COV-2) (CORONAVIRUS DISEASE [COVID-19]), AMPLIFIED PROBE TECHNIQUE, MAKING USE OF HIGH THROUGHPUT TECHNOLOGIES AS DESCRIBED BY CMS-2020-01-R: HCPCS

## 2021-01-27 PROCEDURE — 99285 EMERGENCY DEPT VISIT HI MDM: CPT

## 2021-01-27 PROCEDURE — 83735 ASSAY OF MAGNESIUM: CPT

## 2021-01-27 PROCEDURE — 93005 ELECTROCARDIOGRAM TRACING: CPT | Performed by: EMERGENCY MEDICINE

## 2021-01-27 PROCEDURE — 84443 ASSAY THYROID STIM HORMONE: CPT

## 2021-01-27 PROCEDURE — 93005 ELECTROCARDIOGRAM TRACING: CPT

## 2021-01-27 PROCEDURE — 71045 X-RAY EXAM CHEST 1 VIEW: CPT

## 2021-01-27 PROCEDURE — 80053 COMPREHEN METABOLIC PANEL: CPT

## 2021-01-27 PROCEDURE — 96375 TX/PRO/DX INJ NEW DRUG ADDON: CPT

## 2021-01-27 PROCEDURE — 84484 ASSAY OF TROPONIN QUANT: CPT

## 2021-01-27 RX ORDER — LABETALOL HYDROCHLORIDE 5 MG/ML
10 INJECTION, SOLUTION INTRAVENOUS ONCE
Status: COMPLETED | OUTPATIENT
Start: 2021-01-27 | End: 2021-01-27

## 2021-01-27 RX ORDER — ATORVASTATIN CALCIUM 40 MG/1
40 TABLET, FILM COATED ORAL DAILY
COMMUNITY

## 2021-01-27 RX ORDER — LABETALOL HYDROCHLORIDE 5 MG/ML
10 INJECTION, SOLUTION INTRAVENOUS
Status: DISCONTINUED | OUTPATIENT
Start: 2021-01-27 | End: 2021-01-27

## 2021-01-27 RX ORDER — LORAZEPAM 2 MG/ML
1 INJECTION INTRAMUSCULAR ONCE
Status: COMPLETED | OUTPATIENT
Start: 2021-01-27 | End: 2021-01-27

## 2021-01-27 RX ORDER — LISINOPRIL 5 MG/1
5 TABLET ORAL DAILY
Qty: 30 TAB | Refills: 0 | Status: SHIPPED | OUTPATIENT
Start: 2021-01-27

## 2021-01-27 RX ORDER — SODIUM CHLORIDE, SODIUM LACTATE, POTASSIUM CHLORIDE, CALCIUM CHLORIDE 600; 310; 30; 20 MG/100ML; MG/100ML; MG/100ML; MG/100ML
1000 INJECTION, SOLUTION INTRAVENOUS ONCE
Status: COMPLETED | OUTPATIENT
Start: 2021-01-27 | End: 2021-01-27

## 2021-01-27 RX ORDER — M-VIT,TX,IRON,MINS/CALC/FOLIC 27MG-0.4MG
1 TABLET ORAL DAILY
COMMUNITY

## 2021-01-27 RX ADMIN — SODIUM CHLORIDE, POTASSIUM CHLORIDE, SODIUM LACTATE AND CALCIUM CHLORIDE 1000 ML: 600; 310; 30; 20 INJECTION, SOLUTION INTRAVENOUS at 20:27

## 2021-01-27 RX ADMIN — THIAMINE HYDROCHLORIDE: 100 INJECTION, SOLUTION INTRAMUSCULAR; INTRAVENOUS at 17:43

## 2021-01-27 RX ADMIN — LABETALOL HYDROCHLORIDE 10 MG: 5 INJECTION, SOLUTION INTRAVENOUS at 20:44

## 2021-01-27 RX ADMIN — LORAZEPAM 1 MG: 2 INJECTION INTRAMUSCULAR; INTRAVENOUS at 17:43

## 2021-01-27 ASSESSMENT — LIFESTYLE VARIABLES
DO YOU DRINK ALCOHOL: YES
DOES PATIENT WANT TO STOP DRINKING: YES
TOTAL SCORE: 2
DOES PATIENT WANT TO TALK TO SOMEONE ABOUT QUITTING: NO
HAVE YOU EVER FELT YOU SHOULD CUT DOWN ON YOUR DRINKING: NO
EVER FELT BAD OR GUILTY ABOUT YOUR DRINKING: YES
CONSUMPTION TOTAL: INCOMPLETE
TOTAL SCORE: 2
HAVE PEOPLE ANNOYED YOU BY CRITICIZING YOUR DRINKING: NO
TOTAL SCORE: 2
EVER HAD A DRINK FIRST THING IN THE MORNING TO STEADY YOUR NERVES TO GET RID OF A HANGOVER: YES

## 2021-01-27 ASSESSMENT — FIBROSIS 4 INDEX: FIB4 SCORE: 0.7

## 2021-01-28 LAB
SARS-COV-2 RNA RESP QL NAA+PROBE: NOTDETECTED
SPECIMEN SOURCE: NORMAL

## 2021-01-28 PROCEDURE — 87040 BLOOD CULTURE FOR BACTERIA: CPT

## 2021-01-28 NOTE — ED NOTES
Pt states SOB and anxiety. Pt reports drinking 4 L of beer last night, believes there is a chance that his friends added methamphetamine to his beers. Dr. Styles notified.  IV started, blood drawn and sent to lab.

## 2021-01-28 NOTE — ED NOTES
COVID swab collected per orders and sent to lab. IV fluids remain in progress. Reminded of need for urine specimen per orders - urinal remains at bedside.

## 2021-01-28 NOTE — ED TRIAGE NOTES
Chief Complaint   Patient presents with   • Shortness of Breath     denies cough/fever   • Palpitations     Pt ambulated to triage , c/o palpitation and sob started this morning. Pt said that he drink alcohol last night (3beers). Appears anxious, denies being exposed to covid-19.   ekg completed

## 2021-01-28 NOTE — ED PROVIDER NOTES
ED Provider Note    CHIEF COMPLAINT  Chief Complaint   Patient presents with   • Shortness of Breath     denies cough/fever   • Palpitations       HPI  Jude Montoya is a 40 y.o. male past medical history of hypertension, anxiety/depression who presents with 2 days of palpitations, shortness of breath.  He does not have significant anxiety/depressive symptoms at this time.  He denies chest pain.  He smokes half a pack to a pack of cigarettes a day, does not smoke marijuana, past cocaine/meth use but not currently.  Has never had DVT/PE.  No recent immobilizations.  Takes an unknown drug for blood pressure, he is low on refills, has not taken his blood pressure medication today.  Intermittently takes his anxiety/depression medication.  Had 3 large beers to drink last night.    REVIEW OF SYSTEMS  HEENT: No headache/blurry vision/tinnitus/sore throat/runny nose/cough  CV: No chest pain, leg swelling, orthopnea, PND, does have palpitations  RS: No cough, does have shortness of breath  GI: No N/V/D/C/abdominal pain  MSK: No myalgias/arthralgias  Neuro: No focal weakness/tingling/numbness    PAST MEDICAL HISTORY  Past Medical History:   Diagnosis Date   • Hypertension    • Known health problems: none        FAMILY HISTORY  Family History   Problem Relation Age of Onset   • Hypertension Mother    • Diabetes Mother    • Diabetes Father    • Stroke Father        SOCIAL HISTORY  Social History     Tobacco Use   • Smoking status: Current Every Day Smoker     Packs/day: 0.50     Years: 18.00     Pack years: 9.00     Types: Cigarettes   • Smokeless tobacco: Former User     Types: Chew   • Tobacco comment: Smoke about 10 cig. per day.   Substance Use Topics   • Alcohol use: Yes     Comment: Previously heavy use on weekends - cutting back, and trying to stop (3/2019)   • Drug use: No     Comment: Hx of coccain and meth (inh), but stopped in 2019      Social History     Substance and Sexual Activity   Drug Use No     "Comment: Hx of coccain and meth (inh), but stopped in 2019       SURGICAL HISTORY  No past surgical history on file.    CURRENT MEDICATIONS  Home Medications     Reviewed by Dawna Bella (Pharmacy Tech) on 01/27/21 at 1737  Med List Status: Complete   Medication Last Dose Status   atorvastatin (LIPITOR) 40 MG Tab 1/26/2021 Active   therapeutic multivitamin-minerals (THERAGRAN-M) Tab 1/26/2021 Active                ALLERGIES  No Known Allergies    PHYSICAL EXAM0  VITAL SIGNS: /79   Pulse 100   Temp 36.4 °C (97.5 °F) (Oral)   Resp 14   Ht 1.676 m (5' 6\")   Wt 87 kg (191 lb 12.8 oz)   SpO2 94%   BMI 30.96 kg/m²   Constitutional: Awake, alert, appears anxious  HENT: Atraumatic. Bilateral external ears normal, mucous membranes moist, throat nonerythematous without exudates, nose is normal.  Eyes: PERRL, EOMI, conjunctiva moist, noninjected.  Neck: Nontender, Normal range of motion, No nuchal rigidity, No stridor.   Lymphatic: No lymphadenopathy noted.   Cardiovascular: Regular rhythm, tachycardic, rate in 100s, no murmurs, rubs, gallops.  Thorax & Lungs:  Good breath sounds bilaterally, no wheezes, rales, or retractions.  No chest tenderness.  Abdomen: Bowel sounds normal, Soft, nontender, nondistended, no rebound, guarding, masses.  Back: No CVA or spinal tenderness.  Extremities: Intact distal pulses, No edema, No tenderness.   Skin: Warm, Dry, No rashes.   Musculoskeletal: No joint swelling or tenderness.  Neurologic: Alert & oriented x 3, sensory and motor function normal. No focal deficits.   Psychiatric: Appears anxious, normal affect    EKG  EKG Interpretation:  Interpreted by me    Rhythm: Sinus tachycardia  Rate: 112  Intervals: QTc 440  Axis: normal  Ectopy: none  Conduction: normal  ST Segments: no evidence of elevation or depression  T Waves: no acute change  Q Waves: none  Features of LVH  Clinical Impression: No acute injury or ischemic pattern.   Comparison to previous EKG from 9/12/2019 " shows new sinus tachycardia and LVH      LABS  Labs Reviewed   CBC WITH DIFFERENTIAL - Abnormal; Notable for the following components:       Result Value    WBC 16.9 (*)     RBC 6.27 (*)     Hemoglobin 19.7 (*)     Hematocrit 56.9 (*)     Lymphocytes 15.50 (*)     Neutrophils (Absolute) 11.86 (*)     Monos (Absolute) 2.15 (*)     All other components within normal limits   COMP METABOLIC PANEL - Abnormal; Notable for the following components:    Glucose 140 (*)     Alkaline Phosphatase 136 (*)     All other components within normal limits   TROPONIN   MAGNESIUM   TSH   FREE THYROXINE   ESTIMATED GFR   URINE DRUG SCREEN   URINALYSIS,CULTURE IF INDICATED   BLOOD CULTURE   BLOOD CULTURE   SARS-COV-2, PCR (IN-HOUSE)       All labs reviewed by me.      RADIOLOGY/PROCEDURES  DX-CHEST-LIMITED (1 VIEW)   Final Result      Negative single view of the chest.          The radiologist's interpretations of all radiological studies have been reviewed by me.        COURSE & MEDICAL DECISION MAKING  Pertinent Labs & Imaging studies reviewed. (See chart for details)    30-year-old male with past medical history of hypertension, anxiety/depression, previous meth/cocaine use, marijuana use, recent alcohol consumption of 3 beers last night, presenting with palpitations, shortness of breath.  Differential diagnosis includes adrenergic activation from alcohol versus meth/cocaine, thyrotoxicosis, anxiety/panic.    Tachycardia and hypertension improved after administration of IV fluids and labetalol.  Patient feels better and well enough to go home today.  He could likely be dehydrated from recent alcohol consumption as he has responded well to IV fluid hydration.  His palpitations could also be related to a cardiac cause.  He is being referred to cardiology for his palpitations, he might need further work-up as an outpatient.    He has been prescribed lisinopril 5 mg p.o. daily for hypertension.  He is to follow-up with his PCP and call  the office tomorrow.          FINAL IMPRESSION  1. Palpitations    2. Sinus tachycardia    3. SOB (shortness of breath)    4. Essential hypertension    5. Hypertensive urgency          Electronically signed by: Nasir Styles M.D., 1/27/2021 5:12 PM

## 2021-02-02 LAB
BACTERIA BLD CULT: NORMAL
SIGNIFICANT IND 70042: NORMAL
SITE SITE: NORMAL
SOURCE SOURCE: NORMAL

## 2021-02-03 LAB
BACTERIA BLD CULT: NORMAL
SIGNIFICANT IND 70042: NORMAL
SITE SITE: NORMAL
SOURCE SOURCE: NORMAL

## 2021-11-12 NOTE — ED NOTES
Detox bag DC'd. LR bolus initiated. Pt resting comfortably.   Should have 1 refill on this please verify.     Junior Roe PA-C

## 2023-10-14 ENCOUNTER — OFFICE VISIT (OUTPATIENT)
Dept: URGENT CARE | Facility: PHYSICIAN GROUP | Age: 43
End: 2023-10-14

## 2023-10-14 VITALS
WEIGHT: 176.37 LBS | HEART RATE: 79 BPM | SYSTOLIC BLOOD PRESSURE: 124 MMHG | TEMPERATURE: 98.9 F | RESPIRATION RATE: 16 BRPM | HEIGHT: 66 IN | OXYGEN SATURATION: 93 % | BODY MASS INDEX: 28.34 KG/M2 | DIASTOLIC BLOOD PRESSURE: 86 MMHG

## 2023-10-14 DIAGNOSIS — A08.4 VIRAL GASTROENTERITIS: ICD-10-CM

## 2023-10-14 PROCEDURE — 3079F DIAST BP 80-89 MM HG: CPT | Performed by: FAMILY MEDICINE

## 2023-10-14 PROCEDURE — 99203 OFFICE O/P NEW LOW 30 MIN: CPT | Performed by: FAMILY MEDICINE

## 2023-10-14 PROCEDURE — 3074F SYST BP LT 130 MM HG: CPT | Performed by: FAMILY MEDICINE

## 2023-10-14 RX ORDER — ONDANSETRON HYDROCHLORIDE 8 MG/1
8 TABLET, FILM COATED ORAL EVERY 8 HOURS PRN
Qty: 15 TABLET | Refills: 0 | Status: SHIPPED | OUTPATIENT
Start: 2023-10-14

## 2023-10-14 RX ORDER — OMEPRAZOLE 40 MG/1
40 CAPSULE, DELAYED RELEASE ORAL DAILY
Qty: 15 CAPSULE | Refills: 0 | Status: SHIPPED | OUTPATIENT
Start: 2023-10-14

## 2023-10-14 ASSESSMENT — ENCOUNTER SYMPTOMS: FEVER: 0

## 2023-10-14 NOTE — PROGRESS NOTES
"Subjective:     Jude Montoya is a 43 y.o. male who presents for Emesis (X 3 days, pt states he's unable to eat like usual ) and Cough    HPI  Pt presents for evaluation of an acute problem  Pt with emesis the past 3 days   Having some diarrhea   Has minimal cough   Has \"nausea\" in his abdomen  No dizziness   No abd pain   Has vomiting a few times per day.  Tolerating foods and liquids yesterday, but not able to tolerate any liquids today     Review of Systems   Constitutional:  Negative for fever.   Skin:  Negative for rash.     PMH:  has a past medical history of Hypertension and Known health problems: none.  MEDS:   Current Outpatient Medications:     metFORMIN (GLUCOPHAGE) 500 MG Tab, TAKE 1 TABLET BY MOUTH EVERY DAY WITH A MEAL Oral Once a day for 90 days, Disp: , Rfl:     ondansetron (ZOFRAN) 8 MG Tab, Take 1 Tablet by mouth every 8 hours as needed for Nausea/Vomiting., Disp: 15 Tablet, Rfl: 0    omeprazole (PRILOSEC) 40 MG delayed-release capsule, Take 1 Capsule by mouth every day., Disp: 15 Capsule, Rfl: 0    therapeutic multivitamin-minerals (THERAGRAN-M) Tab, Take 1 Tab by mouth every day., Disp: , Rfl:     atorvastatin (LIPITOR) 40 MG Tab, Take 40 mg by mouth every day., Disp: , Rfl:     lisinopril (PRINIVIL) 5 MG Tab, Take 1 Tab by mouth every day., Disp: 30 Tab, Rfl: 0  ALLERGIES: No Known Allergies  SURGHX: History reviewed. No pertinent surgical history.  SOCHX:  reports that he has been smoking cigarettes. He has a 9.0 pack-year smoking history. He has quit using smokeless tobacco.  His smokeless tobacco use included chew. He reports current alcohol use. He reports that he does not use drugs.     Objective:   /86 (BP Location: Right arm, Patient Position: Sitting, BP Cuff Size: Small adult)   Pulse 79   Temp 37.2 °C (98.9 °F) (Temporal)   Resp 16   Ht 1.676 m (5' 6\")   Wt 80 kg (176 lb 5.9 oz)   SpO2 93%   BMI 28.47 kg/m²     Physical Exam  Constitutional:       General: He is " not in acute distress.     Appearance: He is well-developed. He is not diaphoretic.   HENT:      Mouth/Throat:      Mouth: Mucous membranes are moist.      Comments: Mild erythema in the posterior pharynx  Pulmonary:      Effort: Pulmonary effort is normal.   Abdominal:      General: Abdomen is flat. There is no distension.      Palpations: Abdomen is soft.      Tenderness: There is no abdominal tenderness. There is no right CVA tenderness, left CVA tenderness, guarding or rebound.      Comments: Hyperactive bowel sounds   Neurological:      Mental Status: He is alert.         Assessment/Plan:   Assessment    1. Viral gastroenteritis  - ondansetron (ZOFRAN) 8 MG Tab; Take 1 Tablet by mouth every 8 hours as needed for Nausea/Vomiting.  Dispense: 15 Tablet; Refill: 0  - omeprazole (PRILOSEC) 40 MG delayed-release capsule; Take 1 Capsule by mouth every day.  Dispense: 15 Capsule; Refill: 0    Other orders  - metFORMIN (GLUCOPHAGE) 500 MG Tab; TAKE 1 TABLET BY MOUTH EVERY DAY WITH A MEAL Oral Once a day for 90 days    Patient with viral gastroenteritis.  Recommended treatment with combination omeprazole and Zofran.  Advised to stick to simple foods for the next few days and he was also given a note for work.  He is not tachycardic and does not appear clinically dehydrated at this time.  No indication for ER triage for IV fluids.  Follow-up in the urgent care as needed.

## 2023-10-14 NOTE — LETTER
October 14, 2023    To Whom It May Concern:         This is confirmation that Jude Montoya attended his scheduled appointment with Brad Sierra M.D. on 10/14/23.  He is anticipated to miss work tomorrow due to an acute illness.  He may return on or before 10/17/23.  Thank you for making accommodations as he recovers.           If you have any questions please do not hesitate to call me at the phone number listed below.    Sincerely,          Brad Sierra M.D.  [phone]